# Patient Record
Sex: FEMALE | Race: WHITE | URBAN - METROPOLITAN AREA
[De-identification: names, ages, dates, MRNs, and addresses within clinical notes are randomized per-mention and may not be internally consistent; named-entity substitution may affect disease eponyms.]

---

## 2017-09-08 PROBLEM — M50.00 CERVICAL DISC DISEASE WITH MYELOPATHY: Status: ACTIVE | Noted: 2017-09-08

## 2018-02-07 PROCEDURE — 82746 ASSAY OF FOLIC ACID SERUM: CPT | Performed by: INTERNAL MEDICINE

## 2018-02-07 PROCEDURE — 82607 VITAMIN B-12: CPT | Performed by: INTERNAL MEDICINE

## 2019-03-13 PROBLEM — L81.4 LENTIGO: Status: ACTIVE | Noted: 2019-03-13

## 2019-03-28 PROBLEM — D23.9 TRICHOEPITHELIOMA: Status: ACTIVE | Noted: 2019-03-28

## 2020-08-24 NOTE — PROGRESS NOTES
HISTORY OF PRESENT ILLNESS  Patient presents with:  Weight Problem: referred by current patient      Daytonevelia Beltret is a 64year old female new to our office today for initiation of medical weight loss program.  Patient presents today with c/o excess negative  Diabetes: IFG  Thyroid disease: negative  Constipation: negative  Other pertinent hx: anemia- unknown type  Sleep Apnea hx: yes, no CPAP use  Cancer hx: negative  Family or personal history of Pancreatic issues / Medullary Thyroid Cancer: negativ HGB 11.9 (L) 07/14/2020    HCT 39.9 07/14/2020    MCV 77.8 (L) 07/14/2020    MCH 23.2 (L) 07/14/2020    MCHC 29.8 (L) 07/14/2020    RDW 16.5 (H) 07/14/2020     07/14/2020    MPV 9.0 (L) 12/27/2012     Lab Results   Component Value Date     mg total) by mouth every morning.  -     OP REFERRAL TO DIETITIAN [facility] (WLC USE ONLY)    IFG (impaired fasting glucose)  - reviewed labs in EMR  -     HEMOGLOBIN A1C; Future  -     OP REFERRAL TO DIETITIAN [facility]LC (WLC USE ONLY)    Vitamin D deficiency with meals and throughout the day, cut down on soda and/or juice if consumed. 2.  Eat breakfast daily and focus on having protein with each meal, examples include: greek yogurt, cottage cheese, hard boiled egg, whole grain toast with peanut butter.   3.  R

## 2020-08-25 ENCOUNTER — OFFICE VISIT (OUTPATIENT)
Dept: INTERNAL MEDICINE CLINIC | Facility: CLINIC | Age: 60
End: 2020-08-25
Payer: COMMERCIAL

## 2020-08-25 VITALS
DIASTOLIC BLOOD PRESSURE: 88 MMHG | BODY MASS INDEX: 42.63 KG/M2 | SYSTOLIC BLOOD PRESSURE: 130 MMHG | TEMPERATURE: 97 F | HEIGHT: 65.5 IN | WEIGHT: 259 LBS | RESPIRATION RATE: 14 BRPM | HEART RATE: 80 BPM

## 2020-08-25 DIAGNOSIS — Z51.81 ENCOUNTER FOR THERAPEUTIC DRUG MONITORING: Primary | ICD-10-CM

## 2020-08-25 DIAGNOSIS — E55.9 VITAMIN D DEFICIENCY: ICD-10-CM

## 2020-08-25 DIAGNOSIS — R73.01 IFG (IMPAIRED FASTING GLUCOSE): ICD-10-CM

## 2020-08-25 DIAGNOSIS — E66.01 MORBID OBESITY WITH BMI OF 40.0-44.9, ADULT (HCC): ICD-10-CM

## 2020-08-25 DIAGNOSIS — D64.9 ANEMIA, UNSPECIFIED TYPE: ICD-10-CM

## 2020-08-25 PROCEDURE — 3079F DIAST BP 80-89 MM HG: CPT | Performed by: NURSE PRACTITIONER

## 2020-08-25 PROCEDURE — 3008F BODY MASS INDEX DOCD: CPT | Performed by: NURSE PRACTITIONER

## 2020-08-25 PROCEDURE — 99204 OFFICE O/P NEW MOD 45 MIN: CPT | Performed by: NURSE PRACTITIONER

## 2020-08-25 PROCEDURE — 3075F SYST BP GE 130 - 139MM HG: CPT | Performed by: NURSE PRACTITIONER

## 2020-08-25 RX ORDER — VALACYCLOVIR HYDROCHLORIDE 1 G/1
TABLET, FILM COATED ORAL 2 TIMES DAILY
COMMUNITY
Start: 2020-03-18 | End: 2021-06-18

## 2020-08-25 RX ORDER — ERGOCALCIFEROL 1.25 MG/1
50000 CAPSULE ORAL WEEKLY
Qty: 12 CAPSULE | Refills: 1 | Status: SHIPPED | OUTPATIENT
Start: 2020-08-25 | End: 2021-03-26 | Stop reason: ALTCHOICE

## 2020-08-25 RX ORDER — PHENTERMINE HYDROCHLORIDE 15 MG/1
15 CAPSULE ORAL EVERY MORNING
Qty: 30 CAPSULE | Refills: 0 | Status: SHIPPED | OUTPATIENT
Start: 2020-08-25 | End: 2020-09-22 | Stop reason: DRUGHIGH

## 2020-08-25 NOTE — PATIENT INSTRUCTIONS
Welcome to the [facility]...your Lifestyle Renovation begins now! Thank you for placing your trust in our health care team, I look forward to working with you along this journey to better health!     Next steps:     1.  Sched minimum. Meditation daily can help manage and control stress. Chronic stress can make weight loss difficult.   Exercising is one way to help with stress, but meditation using the CALM Breonna or another comparable alternative can be done in your home or plac

## 2020-09-16 ENCOUNTER — OFFICE VISIT (OUTPATIENT)
Dept: INTERNAL MEDICINE CLINIC | Facility: CLINIC | Age: 60
End: 2020-09-16
Payer: COMMERCIAL

## 2020-09-16 DIAGNOSIS — E66.01 MORBID OBESITY WITH BMI OF 40.0-44.9, ADULT (HCC): ICD-10-CM

## 2020-09-16 DIAGNOSIS — R73.01 IFG (IMPAIRED FASTING GLUCOSE): ICD-10-CM

## 2020-09-16 DIAGNOSIS — E78.00 HYPERCHOLESTEROLEMIA: ICD-10-CM

## 2020-09-16 PROCEDURE — 97802 MEDICAL NUTRITION INDIV IN: CPT | Performed by: DIETITIAN, REGISTERED

## 2020-09-17 VITALS — WEIGHT: 254 LBS | BODY MASS INDEX: 41 KG/M2

## 2020-09-17 NOTE — PROGRESS NOTES
INITIAL OUTPATIENT NUTRITION CONSULTATION    Nutrition Assessment    Medical Diagnosis: obesity, hypercholesterolemia, IFT    Client Age and Gender: 64year old female    Marital Status and Occupation: , works PT from home.   Has adult children a 230 lbs winning work weight loss competition followed by regain after competition. Goal of weighing less than 200 lbs. Current Diet: Keto diet but not able to sustain low carb intake. High fat meats and dairy with limited vegetables. No fruit.   Most

## 2020-09-22 ENCOUNTER — OFFICE VISIT (OUTPATIENT)
Dept: INTERNAL MEDICINE CLINIC | Facility: CLINIC | Age: 60
End: 2020-09-22
Payer: COMMERCIAL

## 2020-09-22 VITALS
BODY MASS INDEX: 41.65 KG/M2 | SYSTOLIC BLOOD PRESSURE: 130 MMHG | DIASTOLIC BLOOD PRESSURE: 90 MMHG | HEART RATE: 80 BPM | TEMPERATURE: 97 F | RESPIRATION RATE: 14 BRPM | HEIGHT: 65.5 IN | WEIGHT: 253 LBS

## 2020-09-22 DIAGNOSIS — D50.9 IRON DEFICIENCY ANEMIA, UNSPECIFIED IRON DEFICIENCY ANEMIA TYPE: ICD-10-CM

## 2020-09-22 DIAGNOSIS — Z51.81 ENCOUNTER FOR THERAPEUTIC DRUG MONITORING: Primary | ICD-10-CM

## 2020-09-22 DIAGNOSIS — R73.03 PREDIABETES: ICD-10-CM

## 2020-09-22 DIAGNOSIS — E66.01 MORBID OBESITY WITH BMI OF 40.0-44.9, ADULT (HCC): ICD-10-CM

## 2020-09-22 LAB
FERRITIN, SERUM: 12 NG/ML (ref 15–150)
HEMOGLOBIN A1C: 6.1 (ref 4.8–5.6)
IRON SATURATION: 9 % (ref 15–55)
IRON: 35 UG/DL (ref 27–159)
TOTAL IRON BINDING CAPACITY: 401 UG/DL (ref 250–450)
UIBC: 366 UG/DL (ref 131–425)
VITAMIN B12: 553 PG/ML (ref 232–1245)

## 2020-09-22 PROCEDURE — 3008F BODY MASS INDEX DOCD: CPT | Performed by: NURSE PRACTITIONER

## 2020-09-22 PROCEDURE — 3080F DIAST BP >= 90 MM HG: CPT | Performed by: NURSE PRACTITIONER

## 2020-09-22 PROCEDURE — 3075F SYST BP GE 130 - 139MM HG: CPT | Performed by: NURSE PRACTITIONER

## 2020-09-22 PROCEDURE — 99214 OFFICE O/P EST MOD 30 MIN: CPT | Performed by: NURSE PRACTITIONER

## 2020-09-22 RX ORDER — PHENTERMINE HYDROCHLORIDE 37.5 MG/1
37.5 TABLET ORAL EVERY MORNING
Qty: 30 TABLET | Refills: 0 | Status: SHIPPED | OUTPATIENT
Start: 2020-09-22 | End: 2020-10-28

## 2020-09-22 RX ORDER — PHENTERMINE HYDROCHLORIDE 15 MG/1
15 CAPSULE ORAL EVERY MORNING
Qty: 30 CAPSULE | Refills: 0 | Status: CANCELLED | OUTPATIENT
Start: 2020-09-22

## 2020-09-22 NOTE — PATIENT INSTRUCTIONS
Continue making lifestyle changes that focus on good nutrition, regular exercise and stress management. Medication Plan: increase phentermine and add Metformin 1 tab daily with meal for 7 days, then increase to 1 tab twice a day as prescribed.     Next s · Being over age 39  · Have hypertension or elevated cholesterol   · Having had gestational diabetes  · Not being physically active  · Being [place] American, [place] American, , [place] Native, , or   Diagnosing prediab Symptoms of diabetes  Let your healthcare provider know if you have any of the following:  · Always feel very tired  · Feel very thirsty or hungry much of the time  · Have to urinate often  · Lose weight for no reason  · Feel numbness or tingling in your f Although daily physical activity, like walking, swimming and aerobics are essential to good heart health and weight management, combining muscle building weight training with cardiovascular exercise, gives you an unbeatable combination to lose fat and keep Non-heme iron is found in plant-based foods such as fruits, vegetables and nuts. Foods with non-heme iron are still an important part of a nutritious, well-balanced diet, but the iron contained in these foods won't be absorbed as completely.  You absorb bet

## 2020-09-22 NOTE — PROGRESS NOTES
Margorie Essex is a 64year old female presents today for 1 month follow-up on medical weight loss program for the treatment of overweight, obesity, or morbid obesity with associated prediabetes, Vitamin D deficiency, WANG.     S:  Current weight Wt Mellissa Deleon /90   Pulse 80   Temp 97.3 °F (36.3 °C)   Resp 14   Ht 65.5\"   Wt 253 lb (114.8 kg)   BMI 41.46 kg/m²   GENERAL: well developed, well nourished, in no apparent distress, morbidly obese  EYES: conjunctiva pink, sclera non icteric, PERRLA  LUNGS: CTA Medication Plan: increase phentermine and add Metformin 1 tab daily with meal for 7 days, then increase to 1 tab twice a day as prescribed.     Next steps to work on before next office visit include: Great work with incorporating more veggies and reducing h · Not being physically active  · Being [place] American, [place] American, , [place] Native, , or [place] Islander  Diagnosing prediabetes  Prediabetes may have no symptoms or you may have some of the symptoms of diabetes.  The diagnosis · Always feel very tired  · Feel very thirsty or hungry much of the time  · Have to urinate often  · Lose weight for no reason  · Feel numbness or tingling in your fingers or toes  · Have cuts or bruises that don’t seem to heal  · Have blurry vision   Date Of course it takes a few weeks before you see any measurable changes. But you’ll start to build muscle, lose fat and burn more calories from the moment you begin weight training. Building muscle helps you:  1. Burn more calories.  Unlike fat, muscle beefs u When you eat heme iron with foods higher in non-heme iron, the iron will be more completely absorbed by your body.  Foods high in vitamin C – like tomatoes, citrus fruits and red, yellow and orange peppers – can also help with the absorption of non-heme iro

## 2020-10-12 ENCOUNTER — PATIENT MESSAGE (OUTPATIENT)
Dept: INTERNAL MEDICINE CLINIC | Facility: CLINIC | Age: 60
End: 2020-10-12

## 2020-10-13 NOTE — TELEPHONE ENCOUNTER
To my knowledge it was only the extended release lots that were recalled, no IR. Can we call her pharmacy to see what the issue is if they are not dispensing the medication? I have not received any other calls from patients taking the IR version.  It may on

## 2020-10-13 NOTE — TELEPHONE ENCOUNTER
From: Anthony Petit  To: DELIA Oswald  Sent: 10/12/2020 5:12 PM CDT  Subject: Prescription Question    I was put on Metformin last visit-I am seeing a ton of recalls should I be taking this?  Its produced by Bruce Walker

## 2020-10-14 NOTE — TELEPHONE ENCOUNTER
Pharmacy is closed until 9 am.  I called the pharmacy back and verified that none of the IR metformin is recalled and Not ALL Metformin ER is recalled. Patient is safe to continue IR.

## 2020-10-20 ENCOUNTER — TELEPHONE (OUTPATIENT)
Dept: INTERNAL MEDICINE CLINIC | Facility: CLINIC | Age: 60
End: 2020-10-20

## 2020-10-28 ENCOUNTER — PATIENT MESSAGE (OUTPATIENT)
Dept: INTERNAL MEDICINE CLINIC | Facility: CLINIC | Age: 60
End: 2020-10-28

## 2020-10-28 ENCOUNTER — OFFICE VISIT (OUTPATIENT)
Dept: INTERNAL MEDICINE CLINIC | Facility: CLINIC | Age: 60
End: 2020-10-28
Payer: COMMERCIAL

## 2020-10-28 VITALS
SYSTOLIC BLOOD PRESSURE: 120 MMHG | BODY MASS INDEX: 40.82 KG/M2 | HEIGHT: 65.5 IN | HEART RATE: 78 BPM | WEIGHT: 248 LBS | DIASTOLIC BLOOD PRESSURE: 68 MMHG | RESPIRATION RATE: 14 BRPM

## 2020-10-28 DIAGNOSIS — R73.03 PREDIABETES: ICD-10-CM

## 2020-10-28 DIAGNOSIS — E78.00 HYPERCHOLESTEROLEMIA: ICD-10-CM

## 2020-10-28 DIAGNOSIS — Z51.81 ENCOUNTER FOR THERAPEUTIC DRUG MONITORING: Primary | ICD-10-CM

## 2020-10-28 DIAGNOSIS — E66.01 MORBID OBESITY WITH BMI OF 40.0-44.9, ADULT (HCC): ICD-10-CM

## 2020-10-28 PROCEDURE — 3074F SYST BP LT 130 MM HG: CPT | Performed by: NURSE PRACTITIONER

## 2020-10-28 PROCEDURE — 3078F DIAST BP <80 MM HG: CPT | Performed by: NURSE PRACTITIONER

## 2020-10-28 PROCEDURE — 99213 OFFICE O/P EST LOW 20 MIN: CPT | Performed by: NURSE PRACTITIONER

## 2020-10-28 PROCEDURE — 3008F BODY MASS INDEX DOCD: CPT | Performed by: NURSE PRACTITIONER

## 2020-10-28 RX ORDER — PHENTERMINE HYDROCHLORIDE 37.5 MG/1
37.5 TABLET ORAL EVERY MORNING
Qty: 30 TABLET | Refills: 0 | Status: SHIPPED | OUTPATIENT
Start: 2020-10-28 | End: 2020-11-24

## 2020-10-28 NOTE — PATIENT INSTRUCTIONS
Continue making lifestyle changes that focus on good nutrition, regular exercise and stress management. Medication Plan: Continue current medication regimen aside from stop Metformin due to side effects.     Next steps to work on before next office visit cells depend on the nutrients you get from food. That is why healthy food choices are so important. Once you can start to see food as being fuel for your body, you will get better at making the healthy choice the easy choice.  Food will be less about rewa with Tracie Frias [phone]  · 360FIT Kassandra @ [email or web address] Group Fitness [phone] and/or email Wicho riddle at [email or web address]. [employer]  · Mateo Goff in [place] - group fitness and personal training for women    Online F

## 2020-10-28 NOTE — PROGRESS NOTES
Danii Villagomez is a 64year old female presents today for 2 month follow-up on medical weight loss program for the treatment of overweight, obesity, or morbid obesity with associated prediabetes, Vitamin D deficiency, WANG.     S:  Current weight Wt Mumtaz Henley developed, well nourished, in no apparent distress, morbidly obese  EYES: conjunctiva pink, sclera non icteric, PERRLA  LUNGS: CTA in all fields, breathing non labored  CARDIO: RRR without murmur, normal S1 and S2 without clicks or gallops, no pedal edema. Nutrition • By Your Weight Matters Campaign    “Dieting” can start to feel challenging when we begin to associate healthy behaviors with “punishment.” Too often, we overlook the real reason we eat food.  It's not only meant to be enjoyed, but also to provid other difficult health conditions start to arise.   Foods that Support Healthy Cells:  • Unsaturated Fats – Fish, nuts avocados, olive oil, flax seed, etc.  • Protein – Poultry, lean beef, yogurt, eggs, seeds, beans, etc.  • Antioxidants – Fruits, dark gree https://GoSporty/5min-kitchen-workout/    Nutrition Trackers and Tools  · MyFitness Pal  · LoseIT! · FitFoundation (healthy meals on the go) in [facility] @ www. myhaifbawpcat6d.PlaceSpeak  · Arnie HARRELL- on line prepared meals to go  · Metabolic Meals- on

## 2020-10-29 NOTE — TELEPHONE ENCOUNTER
From: Nelda Livingston  To: DELIA Lucia  Sent: 10/28/2020 7:32 PM CDT  Subject: Visit Follow-up Question    Can you tell me the name of the documentary you told me to watch about food today

## 2020-11-19 ENCOUNTER — TELEPHONE (OUTPATIENT)
Dept: INTERNAL MEDICINE CLINIC | Facility: CLINIC | Age: 60
End: 2020-11-19

## 2020-11-19 DIAGNOSIS — R73.03 PREDIABETES: ICD-10-CM

## 2020-11-19 DIAGNOSIS — Z51.81 ENCOUNTER FOR THERAPEUTIC DRUG MONITORING: ICD-10-CM

## 2020-11-19 DIAGNOSIS — E66.01 MORBID OBESITY WITH BMI OF 40.0-44.9, ADULT (HCC): ICD-10-CM

## 2020-11-19 NOTE — TELEPHONE ENCOUNTER
Rx denied. Per [facility] office note dated 10/28/20 pt is to discontinue medication. Medication Plan: Continue current medication regimen aside from stop Metformin due to side effects.

## 2020-11-23 NOTE — PROGRESS NOTES
Ilia Spring is a 64year old female presents today for 3 month follow-up on medical weight loss program for the treatment of overweight, obesity, or morbid obesity with associated prediabetes, Vitamin D deficiency, WANG.     S:  Current weight Wt Ebbie Good apparent distress, obese  EYES: conjunctiva pink, sclera non icteric, PERRLA  LUNGS: CTA in all fields, breathing non labored  CARDIO: RRR without murmur, normal S1 and S2 without clicks or gallops, no pedal edema.   GI: +BS  NEURO/MS: motor and sensory jimmie work on before next office visit include: Great job being mindful about your hunger, cravings and food choices! Continue this practice into the holidays. Remember moderation is key. Top Tips for Weight Loss    1.  Practice Mindful Eating and listen to yo

## 2020-11-24 ENCOUNTER — OFFICE VISIT (OUTPATIENT)
Dept: INTERNAL MEDICINE CLINIC | Facility: CLINIC | Age: 60
End: 2020-11-24
Payer: COMMERCIAL

## 2020-11-24 ENCOUNTER — PATIENT MESSAGE (OUTPATIENT)
Dept: INTERNAL MEDICINE CLINIC | Facility: CLINIC | Age: 60
End: 2020-11-24

## 2020-11-24 VITALS
HEART RATE: 80 BPM | SYSTOLIC BLOOD PRESSURE: 122 MMHG | RESPIRATION RATE: 16 BRPM | BODY MASS INDEX: 39.84 KG/M2 | DIASTOLIC BLOOD PRESSURE: 84 MMHG | WEIGHT: 242 LBS | HEIGHT: 65.5 IN

## 2020-11-24 DIAGNOSIS — R63.8 CRAVING FOR PARTICULAR FOOD: ICD-10-CM

## 2020-11-24 DIAGNOSIS — Z51.81 ENCOUNTER FOR THERAPEUTIC DRUG MONITORING: Primary | ICD-10-CM

## 2020-11-24 DIAGNOSIS — E78.00 HYPERCHOLESTEROLEMIA: ICD-10-CM

## 2020-11-24 DIAGNOSIS — E66.01 MORBID OBESITY WITH BMI OF 40.0-44.9, ADULT (HCC): ICD-10-CM

## 2020-11-24 DIAGNOSIS — R73.03 PREDIABETES: ICD-10-CM

## 2020-11-24 DIAGNOSIS — Z51.81 ENCOUNTER FOR THERAPEUTIC DRUG MONITORING: ICD-10-CM

## 2020-11-24 PROCEDURE — 99214 OFFICE O/P EST MOD 30 MIN: CPT | Performed by: NURSE PRACTITIONER

## 2020-11-24 PROCEDURE — 3079F DIAST BP 80-89 MM HG: CPT | Performed by: NURSE PRACTITIONER

## 2020-11-24 PROCEDURE — 3008F BODY MASS INDEX DOCD: CPT | Performed by: NURSE PRACTITIONER

## 2020-11-24 PROCEDURE — 3074F SYST BP LT 130 MM HG: CPT | Performed by: NURSE PRACTITIONER

## 2020-11-24 PROCEDURE — 99072 ADDL SUPL MATRL&STAF TM PHE: CPT | Performed by: NURSE PRACTITIONER

## 2020-11-24 RX ORDER — TOPIRAMATE 25 MG/1
25 TABLET ORAL 2 TIMES DAILY
Qty: 60 TABLET | Refills: 1 | Status: SHIPPED | OUTPATIENT
Start: 2020-11-24 | End: 2021-01-13

## 2020-11-24 RX ORDER — PHENTERMINE HYDROCHLORIDE 37.5 MG/1
37.5 TABLET ORAL EVERY MORNING
Qty: 30 TABLET | Refills: 1 | Status: SHIPPED | OUTPATIENT
Start: 2020-11-24 | End: 2021-01-13

## 2020-11-24 NOTE — PATIENT INSTRUCTIONS
Continue making lifestyle changes that focus on good nutrition, regular exercise and stress management. Medication Plan: Continue current medication regimen, can continue to Metformin at dinner 1-2 tabs.  Additionally start Topamax at 1 tab daily for 7 d

## 2020-11-24 NOTE — TELEPHONE ENCOUNTER
Requesting Metformin 500   LOV: 11/23  RTC: 2 mtht  Last Relevant Labs:   Filled: 9/22 #60 with 1 refills    No future appointments.

## 2020-11-24 NOTE — TELEPHONE ENCOUNTER
From: Nicki Lloyd  To: DELIA Dubois  Sent: 11/24/2020 2:05 PM CST  Subject: Prescription Question    Can you refill the Metformin 500 Thanks

## 2020-12-01 NOTE — TELEPHONE ENCOUNTER
Prescription was printed not sent.  [facility] [place] [facility]

## 2021-01-13 ENCOUNTER — OFFICE VISIT (OUTPATIENT)
Dept: INTERNAL MEDICINE CLINIC | Facility: CLINIC | Age: 61
End: 2021-01-13
Payer: COMMERCIAL

## 2021-01-13 VITALS
SYSTOLIC BLOOD PRESSURE: 120 MMHG | HEART RATE: 80 BPM | HEIGHT: 65.5 IN | DIASTOLIC BLOOD PRESSURE: 80 MMHG | WEIGHT: 231 LBS | BODY MASS INDEX: 38.02 KG/M2 | RESPIRATION RATE: 14 BRPM

## 2021-01-13 DIAGNOSIS — Z51.81 ENCOUNTER FOR THERAPEUTIC DRUG MONITORING: Primary | ICD-10-CM

## 2021-01-13 DIAGNOSIS — R73.03 PREDIABETES: ICD-10-CM

## 2021-01-13 DIAGNOSIS — R63.8 CRAVING FOR PARTICULAR FOOD: ICD-10-CM

## 2021-01-13 DIAGNOSIS — E55.9 VITAMIN D DEFICIENCY: ICD-10-CM

## 2021-01-13 DIAGNOSIS — E66.01 MORBID OBESITY WITH BMI OF 40.0-44.9, ADULT (HCC): ICD-10-CM

## 2021-01-13 PROCEDURE — 3008F BODY MASS INDEX DOCD: CPT | Performed by: NURSE PRACTITIONER

## 2021-01-13 PROCEDURE — 99214 OFFICE O/P EST MOD 30 MIN: CPT | Performed by: NURSE PRACTITIONER

## 2021-01-13 PROCEDURE — 3079F DIAST BP 80-89 MM HG: CPT | Performed by: NURSE PRACTITIONER

## 2021-01-13 PROCEDURE — 3074F SYST BP LT 130 MM HG: CPT | Performed by: NURSE PRACTITIONER

## 2021-01-13 RX ORDER — PHENTERMINE HYDROCHLORIDE 37.5 MG/1
37.5 TABLET ORAL EVERY MORNING
Qty: 30 TABLET | Refills: 2 | Status: SHIPPED | OUTPATIENT
Start: 2021-01-13 | End: 2021-04-15

## 2021-01-13 RX ORDER — TOPIRAMATE 50 MG/1
50 TABLET, FILM COATED ORAL 2 TIMES DAILY
Qty: 60 TABLET | Refills: 2 | Status: SHIPPED | OUTPATIENT
Start: 2021-01-13 | End: 2021-04-15

## 2021-01-13 NOTE — PROGRESS NOTES
Danii Villagomez is a 64year old female presents today for 5 month follow-up on medical weight loss program for the treatment of overweight, obesity, or morbid obesity with associated prediabetes, Vitamin D deficiency, WANG.     S:  Current weight Wt Mumtaz Henley without murmur, normal S1 and S2 without clicks or gallops, no pedal edema.   GI: +BS  NEURO/MS: motor and sensory grossly intact  PSYCH: pleasant, cooperative, normal mood and affect    ASSESSMENT AND PLAN:  Encounter for therapeutic drug monitoring  (prim D level and HgbA1c. Build Muscle & Lose Fat  The great fat vs muscle diagram below paints a clear picture of why it’s so important for you to build muscle in order to lose fat.   Maybe Kris Rivera wondered about muscle vs fat and why you need to build muscle level of confidence in your abilities to perform many lifestyle activities. 4. Prevent injury. Strength training can build stronger muscles and more limber, flexible joints, which play a crucial role in preventing injury. 5. Increase bone density.  Weight and thirst. As a result, you get hungry and want to eat. When we gain weight, our brains become less sensitive to signals that control how responsive our brain is to nutrients.  For example, someone at a higher weight may not have as much leptin (the ful article (CityPerson.tn. org/community/hunger-why-do-we-have-cravings-and-what-can-gg-aw-xjsib-them/) from the [address] of the [facility].      Understanding the Link Between Biology, Weight and Health  S Our environment can make weight-loss difficult by providing readily available, energy dense and high palatable foods while promoting sedentary levels and low activity  Our weight affects our health, not just our appearance – Although weight is a concern fo exercise routine, or eating at home more with your family. These are just some of your many options! Want more information about the link between your biology, weight and health?  You can watch the full video online as Dr. Dot Galvan speaks to attende

## 2021-01-13 NOTE — PATIENT INSTRUCTIONS
Continue making lifestyle changes that focus on good nutrition, regular exercise and stress management. Medication Plan: Continue current medication regimen, aside from increase Topamax to 50 mg twice a day for cravings.     Next steps to work on before measurable changes. But you’ll start to build muscle, lose fat and burn more calories from the moment you begin weight training. Building muscle helps you:  1. Burn more calories.  Unlike fat, muscle beefs up your metabolism to help you burn about 70% more same way. It is important that the role of biology in hunger is more widely recognized to help others understand how complicated weight issues can be. Gut-Brain Hunger Signals  Why do we get hungry?  Your digestive tract has receptors that can detect whe hunger signals. However, it can be tough to manage when you are really hungry, stressed or upset in any way. When we are in a better mood and not starving, it’s easier to ignore food cues and cravings.  When we are stressed or depressed, we are more like that we inherit through birth and are passed down through our families   • Stress/mood – Our mental health affects our hunger, sleep cycle and other important brain functions   • Medications – Many medications are associated with weight gain such as insuli this, but try doing what you can! Strive for at least seven to eight hours of sleep each night by going to bed earlier, relaxing before bedtime or drinking calming tea in the evenings.    • Monitor food intake – Be aware of what’s going into your body and h

## 2021-02-04 DIAGNOSIS — R73.03 PREDIABETES: ICD-10-CM

## 2021-02-04 DIAGNOSIS — R63.8 CRAVING FOR PARTICULAR FOOD: ICD-10-CM

## 2021-02-04 DIAGNOSIS — E55.9 VITAMIN D DEFICIENCY: ICD-10-CM

## 2021-02-04 DIAGNOSIS — E66.01 MORBID OBESITY WITH BMI OF 40.0-44.9, ADULT (HCC): ICD-10-CM

## 2021-02-04 DIAGNOSIS — Z51.81 ENCOUNTER FOR THERAPEUTIC DRUG MONITORING: ICD-10-CM

## 2021-02-04 RX ORDER — TOPIRAMATE 50 MG/1
50 TABLET, FILM COATED ORAL 2 TIMES DAILY
Qty: 60 TABLET | Refills: 2 | Status: CANCELLED | OUTPATIENT
Start: 2021-02-04

## 2021-02-04 RX ORDER — ERGOCALCIFEROL 1.25 MG/1
50000 CAPSULE ORAL WEEKLY
Qty: 12 CAPSULE | Refills: 1 | Status: CANCELLED | OUTPATIENT
Start: 2021-02-04

## 2021-02-04 RX ORDER — PHENTERMINE HYDROCHLORIDE 37.5 MG/1
37.5 TABLET ORAL EVERY MORNING
Qty: 30 TABLET | Refills: 2 | Status: CANCELLED | OUTPATIENT
Start: 2021-02-04

## 2021-02-05 NOTE — TELEPHONE ENCOUNTER
Requesting Vitamin D, Phentermine, Metformin, Topamax  LOV: 1/13/21  RTC: 3 months  Last Relevant Labs: 7/14/20 and 8/27/20  Filled: 8/25/20 #12 with 1 refills Vitamin D  Filled: 1/13/21 #30 with 2 refills  Phentemrine  Filled: 1/13/21 #60 with 1 refills  Metformin  Filled: 1/13/21 #60 with 2 refills  Topamax    No future appointments.

## 2021-04-14 DIAGNOSIS — E66.01 MORBID OBESITY WITH BMI OF 40.0-44.9, ADULT (HCC): ICD-10-CM

## 2021-04-14 DIAGNOSIS — R63.8 CRAVING FOR PARTICULAR FOOD: ICD-10-CM

## 2021-04-14 DIAGNOSIS — R73.03 PREDIABETES: ICD-10-CM

## 2021-04-14 DIAGNOSIS — Z51.81 ENCOUNTER FOR THERAPEUTIC DRUG MONITORING: ICD-10-CM

## 2021-04-19 RX ORDER — TOPIRAMATE 50 MG/1
TABLET, FILM COATED ORAL
Qty: 60 TABLET | Refills: 2 | OUTPATIENT
Start: 2021-04-19

## 2021-04-19 RX ORDER — PHENTERMINE HYDROCHLORIDE 37.5 MG/1
TABLET ORAL
Qty: 30 TABLET | Refills: 0 | OUTPATIENT
Start: 2021-04-19

## 2021-04-19 RX ORDER — PHENTERMINE HYDROCHLORIDE 37.5 MG/1
37.5 TABLET ORAL EVERY MORNING
Qty: 30 TABLET | Refills: 2 | OUTPATIENT
Start: 2021-04-19

## 2021-04-19 RX ORDER — TOPIRAMATE 50 MG/1
50 TABLET, FILM COATED ORAL 2 TIMES DAILY
Qty: 60 TABLET | Refills: 2 | OUTPATIENT
Start: 2021-04-19

## 2021-04-19 NOTE — TELEPHONE ENCOUNTER
Requesting   Requested Prescriptions     Pending Prescriptions Disp Refills   • Phentermine HCl 37.5 MG Oral Tab 30 tablet 2     Sig: Take 1 tablet (37.5 mg total) by mouth every morning.    • metFORMIN HCl 500 MG Oral Tab 60 tablet 1     Sig: Take 1 tablet

## 2021-04-21 ENCOUNTER — LAB ENCOUNTER (OUTPATIENT)
Dept: LAB | Age: 61
End: 2021-04-21
Attending: NURSE PRACTITIONER
Payer: COMMERCIAL

## 2021-04-21 ENCOUNTER — OFFICE VISIT (OUTPATIENT)
Dept: INTERNAL MEDICINE CLINIC | Facility: CLINIC | Age: 61
End: 2021-04-21
Payer: COMMERCIAL

## 2021-04-21 VITALS
RESPIRATION RATE: 14 BRPM | HEIGHT: 65.5 IN | HEART RATE: 78 BPM | BODY MASS INDEX: 35.72 KG/M2 | WEIGHT: 217 LBS | DIASTOLIC BLOOD PRESSURE: 80 MMHG | SYSTOLIC BLOOD PRESSURE: 120 MMHG

## 2021-04-21 DIAGNOSIS — R63.8 CRAVING FOR PARTICULAR FOOD: ICD-10-CM

## 2021-04-21 DIAGNOSIS — E66.01 MORBID OBESITY WITH BMI OF 40.0-44.9, ADULT (HCC): ICD-10-CM

## 2021-04-21 DIAGNOSIS — R73.03 PREDIABETES: ICD-10-CM

## 2021-04-21 DIAGNOSIS — Z51.81 ENCOUNTER FOR THERAPEUTIC DRUG MONITORING: Primary | ICD-10-CM

## 2021-04-21 PROCEDURE — 3008F BODY MASS INDEX DOCD: CPT | Performed by: NURSE PRACTITIONER

## 2021-04-21 PROCEDURE — 82306 VITAMIN D 25 HYDROXY: CPT | Performed by: NURSE PRACTITIONER

## 2021-04-21 PROCEDURE — 3074F SYST BP LT 130 MM HG: CPT | Performed by: NURSE PRACTITIONER

## 2021-04-21 PROCEDURE — 3079F DIAST BP 80-89 MM HG: CPT | Performed by: NURSE PRACTITIONER

## 2021-04-21 PROCEDURE — 99213 OFFICE O/P EST LOW 20 MIN: CPT | Performed by: NURSE PRACTITIONER

## 2021-04-21 RX ORDER — PHENTERMINE HYDROCHLORIDE 37.5 MG/1
37.5 TABLET ORAL EVERY MORNING
Qty: 30 TABLET | Refills: 2 | Status: SHIPPED | OUTPATIENT
Start: 2021-04-21 | End: 2021-07-19

## 2021-04-21 NOTE — PATIENT INSTRUCTIONS
Continue making lifestyle changes that focus on good nutrition, regular exercise and stress management. Medication Plan: Continue current medication regimen.  Can check to see if insurance covers branded Qsymia (combo of phentermine and topamax) to make

## 2021-04-21 NOTE — PROGRESS NOTES
Danii Villagomez is a 64year old female presents today for 8 month follow-up on medical weight loss program for the treatment of overweight, obesity, or morbid obesity with associated prediabetes, Vitamin D deficiency, WANG.     S:  Current weight Wt Mumtaz Henley developed, well nourished, in no apparent distress, obese  EYES: conjunctiva pink, sclera non icteric, PERRLA  LUNGS: CTA in all fields, breathing non labored  CARDIO: RRR without murmur, normal S1 and S2 without clicks or gallops, no pedal edema.   GI: +BS weight loss! Continue to be mindful of nutrition choices, portions and timing. Adding varied exercise as planned. Consider tracking nutrition to remain accountable for both quality and quantity of food.  Additionally if you would like to follow up with our

## 2021-07-19 ENCOUNTER — OFFICE VISIT (OUTPATIENT)
Dept: INTERNAL MEDICINE CLINIC | Facility: CLINIC | Age: 61
End: 2021-07-19
Payer: COMMERCIAL

## 2021-07-19 VITALS
WEIGHT: 210 LBS | HEIGHT: 65.5 IN | BODY MASS INDEX: 34.57 KG/M2 | SYSTOLIC BLOOD PRESSURE: 110 MMHG | HEART RATE: 70 BPM | RESPIRATION RATE: 14 BRPM | DIASTOLIC BLOOD PRESSURE: 70 MMHG

## 2021-07-19 DIAGNOSIS — R73.03 PREDIABETES: ICD-10-CM

## 2021-07-19 DIAGNOSIS — L65.9 HAIR THINNING: ICD-10-CM

## 2021-07-19 DIAGNOSIS — D50.9 IRON DEFICIENCY ANEMIA, UNSPECIFIED IRON DEFICIENCY ANEMIA TYPE: ICD-10-CM

## 2021-07-19 DIAGNOSIS — R63.8 CRAVING FOR PARTICULAR FOOD: ICD-10-CM

## 2021-07-19 DIAGNOSIS — E55.9 VITAMIN D DEFICIENCY: ICD-10-CM

## 2021-07-19 DIAGNOSIS — E66.01 MORBID OBESITY WITH BMI OF 40.0-44.9, ADULT (HCC): ICD-10-CM

## 2021-07-19 DIAGNOSIS — Z51.81 ENCOUNTER FOR THERAPEUTIC DRUG MONITORING: Primary | ICD-10-CM

## 2021-07-19 PROCEDURE — 3078F DIAST BP <80 MM HG: CPT | Performed by: NURSE PRACTITIONER

## 2021-07-19 PROCEDURE — 99214 OFFICE O/P EST MOD 30 MIN: CPT | Performed by: NURSE PRACTITIONER

## 2021-07-19 PROCEDURE — 3008F BODY MASS INDEX DOCD: CPT | Performed by: NURSE PRACTITIONER

## 2021-07-19 PROCEDURE — 3074F SYST BP LT 130 MM HG: CPT | Performed by: NURSE PRACTITIONER

## 2021-07-19 RX ORDER — METFORMIN HYDROCHLORIDE 750 MG/1
1500 TABLET, EXTENDED RELEASE ORAL
Qty: 180 TABLET | Refills: 1 | Status: SHIPPED | OUTPATIENT
Start: 2021-07-19 | End: 2021-11-10

## 2021-07-19 RX ORDER — TOPIRAMATE 50 MG/1
50 TABLET, FILM COATED ORAL 2 TIMES DAILY
Qty: 60 TABLET | Refills: 2 | Status: SHIPPED | OUTPATIENT
Start: 2021-07-19 | End: 2021-11-10

## 2021-07-19 RX ORDER — PHENTERMINE HYDROCHLORIDE 37.5 MG/1
37.5 TABLET ORAL EVERY MORNING
Qty: 30 TABLET | Refills: 2 | Status: SHIPPED | OUTPATIENT
Start: 2021-07-19 | End: 2021-11-10

## 2021-07-19 NOTE — PROGRESS NOTES
Anmol Mcmillan is a 64year old female presents today for 11 month follow-up on medical weight loss program for the treatment of overweight, obesity, or morbid obesity with associated prediabetes, Vitamin D deficiency, WANG.     S:  Current weight Wt Re conjunctiva pink, sclera non icteric, PERRLA  LUNGS: CTA in all fields, breathing non labored  CARDIO: RRR without murmur, normal S1 and S2 without clicks or gallops, no pedal edema.   GI: +BS  NEURO/MS: motor and sensory grossly intact  PSYCH: pleasant, co aside from stop Metformin and switch to Metformin ER at 1500 mg daily. Next steps to work on before next office visit include: Great work with continued weight loss, now at 20% total body weight loss.  At this time the body can experience a process call are present. When you don’t have enough nutrients, these receptors tell your stomach to produce hunger hormones that send a signal to your hypothalamus – the part of your brain that controls hunger and thirst. As a result, you get hungry and want to eat. based on our feelings. This is called emotional eating or stress eating. Managing Hunger and Cravings  So, what does all this mean and how can you manage hunger and cravings? Check out the full article (CityPerson.tn. org/community/hunger-why medications for diabetes, antidepressants and more   • Metabolic adaptation – Changes in our metabolism and physiology can affect our energy needs that influence weight-loss   • Environmental drivers – Our environment can make weight-loss difficult by prov Are you controlling your portions? Eating foods that are less-processed and rich with nutrients? • Establish healthy habits – Simple lifestyle changes can do a lot for your body.  Try developing an exercise routine, or eating at home more with your family transmit messages about your appetite to your brain. Talk or eat: When you are talking, stop eating. When you are eating, stop talking. Stay connected: Pay attention to your body's appetite signals while you are eating.     Pause while you are eating:

## 2021-07-19 NOTE — PATIENT INSTRUCTIONS
Continue making lifestyle changes that focus on good nutrition, regular exercise and stress management. Medication Plan: Continue current medication regimen aside from stop Metformin and switch to Metformin ER at 1500 mg daily.      Next steps to work on understand how complicated weight issues can be. Gut-Brain Hunger Signals  Why do we get hungry? Your digestive tract has receptors that can detect when nutrients are present.  When you don’t have enough nutrients, these receptors tell your stomach to pr any way. When we are in a better mood and not starving, it’s easier to ignore food cues and cravings. When we are stressed or depressed, we are more likely to eat based on our feelings. This is called emotional eating or stress eating.     Managing Hunge health affects our hunger, sleep cycle and other important brain functions   • Medications – Many medications are associated with weight gain such as insulin, oral medications for diabetes, antidepressants and more   • Metabolic adaptation – Changes in our going to bed earlier, relaxing before bedtime or drinking calming tea in the evenings. • Monitor food intake – Be aware of what’s going into your body and how much. Are you controlling your portions?  Eating foods that are less-processed and rich with nut mouthful. Chew every mouthful: Chewing a lot helps to thoroughly release the flavor of foods. Let food sit on your tongue. This allows your taste buds to absorb the flavor and transmit messages about your appetite to your brain. Talk or eat:  When you

## 2021-11-10 ENCOUNTER — LAB ENCOUNTER (OUTPATIENT)
Dept: LAB | Age: 61
End: 2021-11-10
Attending: NURSE PRACTITIONER
Payer: COMMERCIAL

## 2021-11-10 ENCOUNTER — OFFICE VISIT (OUTPATIENT)
Dept: INTERNAL MEDICINE CLINIC | Facility: CLINIC | Age: 61
End: 2021-11-10
Payer: COMMERCIAL

## 2021-11-10 VITALS
HEIGHT: 65.5 IN | BODY MASS INDEX: 33.91 KG/M2 | DIASTOLIC BLOOD PRESSURE: 86 MMHG | HEART RATE: 92 BPM | RESPIRATION RATE: 18 BRPM | WEIGHT: 206 LBS | SYSTOLIC BLOOD PRESSURE: 118 MMHG

## 2021-11-10 DIAGNOSIS — E66.01 MORBID OBESITY WITH BMI OF 40.0-44.9, ADULT (HCC): ICD-10-CM

## 2021-11-10 DIAGNOSIS — E55.9 VITAMIN D DEFICIENCY: ICD-10-CM

## 2021-11-10 DIAGNOSIS — R73.03 PREDIABETES: ICD-10-CM

## 2021-11-10 DIAGNOSIS — R63.8 CRAVING FOR PARTICULAR FOOD: ICD-10-CM

## 2021-11-10 DIAGNOSIS — Z51.81 ENCOUNTER FOR THERAPEUTIC DRUG MONITORING: Primary | ICD-10-CM

## 2021-11-10 DIAGNOSIS — L65.9 HAIR THINNING: ICD-10-CM

## 2021-11-10 DIAGNOSIS — Z51.81 ENCOUNTER FOR THERAPEUTIC DRUG MONITORING: ICD-10-CM

## 2021-11-10 PROCEDURE — 3079F DIAST BP 80-89 MM HG: CPT | Performed by: NURSE PRACTITIONER

## 2021-11-10 PROCEDURE — 83036 HEMOGLOBIN GLYCOSYLATED A1C: CPT | Performed by: NURSE PRACTITIONER

## 2021-11-10 PROCEDURE — 84443 ASSAY THYROID STIM HORMONE: CPT | Performed by: NURSE PRACTITIONER

## 2021-11-10 PROCEDURE — 3074F SYST BP LT 130 MM HG: CPT | Performed by: NURSE PRACTITIONER

## 2021-11-10 PROCEDURE — 84439 ASSAY OF FREE THYROXINE: CPT | Performed by: NURSE PRACTITIONER

## 2021-11-10 PROCEDURE — 99214 OFFICE O/P EST MOD 30 MIN: CPT | Performed by: NURSE PRACTITIONER

## 2021-11-10 PROCEDURE — 85025 COMPLETE CBC W/AUTO DIFF WBC: CPT | Performed by: NURSE PRACTITIONER

## 2021-11-10 PROCEDURE — 82306 VITAMIN D 25 HYDROXY: CPT | Performed by: NURSE PRACTITIONER

## 2021-11-10 PROCEDURE — 3008F BODY MASS INDEX DOCD: CPT | Performed by: NURSE PRACTITIONER

## 2021-11-10 PROCEDURE — 82728 ASSAY OF FERRITIN: CPT | Performed by: NURSE PRACTITIONER

## 2021-11-10 RX ORDER — ERGOCALCIFEROL 1.25 MG/1
50000 CAPSULE ORAL WEEKLY
Qty: 24 CAPSULE | Refills: 0 | Status: CANCELLED | OUTPATIENT
Start: 2021-11-10

## 2021-11-10 RX ORDER — TOPIRAMATE 100 MG/1
100 TABLET, FILM COATED ORAL 2 TIMES DAILY
Qty: 180 TABLET | Refills: 1 | Status: SHIPPED | OUTPATIENT
Start: 2021-11-10

## 2021-11-10 RX ORDER — PHENTERMINE HYDROCHLORIDE 37.5 MG/1
37.5 TABLET ORAL EVERY MORNING
Qty: 30 TABLET | Refills: 2 | Status: SHIPPED | OUTPATIENT
Start: 2021-11-10 | End: 2022-02-03

## 2021-11-10 RX ORDER — SEMAGLUTIDE 0.25 MG/.5ML
0.25 INJECTION, SOLUTION SUBCUTANEOUS WEEKLY
Qty: 4 EACH | Refills: 0 | Status: SHIPPED | OUTPATIENT
Start: 2021-11-10 | End: 2021-12-08 | Stop reason: DRUGHIGH

## 2021-11-10 RX ORDER — METFORMIN HYDROCHLORIDE 750 MG/1
1500 TABLET, EXTENDED RELEASE ORAL
Qty: 180 TABLET | Refills: 1 | Status: SHIPPED | OUTPATIENT
Start: 2021-11-10

## 2021-11-10 NOTE — PATIENT INSTRUCTIONS
Continue making lifestyle changes that focus on good nutrition, regular exercise and stress management. Medication Plan: Continue current medication regimen aside from add [facility] REESE BARRAZA weekly and increase Topamax to 100 mg twice a day.  Send [employer] message wi you’re currently trying to lose weight, you might prefer putting this goal on hold for a short period. In this case, reset your goal and strive to maintain your weight instead.  No matter what you try, make sure your primary focus is on how you feel vs. wha They may take it personally, but you don’t have to. #3 Work on Your Mindset  Think about your relationship with food during the holidays. Why do we focus so much on it? Happiness comes from health and confidence.  It comes from being around people we lov

## 2021-11-10 NOTE — PROGRESS NOTES
Imelda Gallardo is a 63year old female presents today for follow-up on medical weight loss program for the treatment of overweight, obesity, or morbid obesity with associated prediabetes, Vitamin D deficiency, WANG.     S:  Current weight Wt Readings fr labored  CARDIO: RRR without murmur, normal S1 and S2 without clicks or gallops, no pedal edema.   GI: +BS  NEURO/MS: motor and sensory grossly intact  PSYCH: pleasant, cooperative, normal mood and affect    ASSESSMENT AND PLAN:  Encounter for therapeutic d next one.     Next steps to work on before next office visit include:       Stress, Food and the Holidays: Tips for Staying in Control  October 30, 2021 • Posted in [facility] • By Your [place] Matters Campaign      For some of us, the mere thought of foods you know will tempt you. If your grandmother bakes the BEST pumpkin pie every year, prepare yourself for it. What’s your plan? Either make an alternative dish or decide ahead of time that you will eat a small amount.  For example, take a couple savory a source of pleasure and togetherness. As the holiday season begins, reflect on your feelings toward food. If you tend to overeat, dig deep to ask yourself why.  It might help if you journal your thoughts or speak with a therapist.    Final Words of Soila

## 2021-12-05 PROBLEM — E66.01 MORBID OBESITY WITH BMI OF 40.0-44.9, ADULT (HCC): Status: RESOLVED | Noted: 2020-08-25 | Resolved: 2021-12-05

## 2021-12-08 DIAGNOSIS — E66.01 MORBID OBESITY WITH BMI OF 40.0-44.9, ADULT (HCC): ICD-10-CM

## 2021-12-08 DIAGNOSIS — Z51.81 ENCOUNTER FOR THERAPEUTIC DRUG MONITORING: Primary | ICD-10-CM

## 2021-12-08 DIAGNOSIS — R73.03 PREDIABETES: ICD-10-CM

## 2021-12-08 RX ORDER — SEMAGLUTIDE 0.25 MG/.5ML
0.25 INJECTION, SOLUTION SUBCUTANEOUS WEEKLY
Qty: 4 EACH | Refills: 0 | Status: CANCELLED | OUTPATIENT
Start: 2021-12-08

## 2021-12-08 RX ORDER — SEMAGLUTIDE 0.5 MG/.5ML
0.5 INJECTION, SOLUTION SUBCUTANEOUS WEEKLY
Qty: 2 ML | Refills: 0 | Status: SHIPPED | OUTPATIENT
Start: 2021-12-08 | End: 2022-01-03

## 2021-12-08 NOTE — TELEPHONE ENCOUNTER
Requesting Wegovy increase  LOV: 11/10/21  RTC: 3 months  Last Relevant Labs: na  Filled: 11/10/21 #4 with 0 refills    Future Appointments   Date Time Provider Romeo Pardo   2/9/2022  2:40 PM DELIA Garnett [facility]     Patient is

## 2022-01-03 DIAGNOSIS — Z51.81 ENCOUNTER FOR THERAPEUTIC DRUG MONITORING: Primary | ICD-10-CM

## 2022-01-03 DIAGNOSIS — R73.03 PREDIABETES: ICD-10-CM

## 2022-01-03 DIAGNOSIS — E66.01 MORBID OBESITY WITH BMI OF 40.0-44.9, ADULT (HCC): ICD-10-CM

## 2022-01-03 RX ORDER — SEMAGLUTIDE 1 MG/.5ML
1 INJECTION, SOLUTION SUBCUTANEOUS WEEKLY
Qty: 2 ML | Refills: 0 | Status: SHIPPED | OUTPATIENT
Start: 2022-01-03 | End: 2022-01-25

## 2022-01-31 DIAGNOSIS — E66.01 MORBID OBESITY WITH BMI OF 40.0-44.9, ADULT (HCC): ICD-10-CM

## 2022-01-31 DIAGNOSIS — Z51.81 ENCOUNTER FOR THERAPEUTIC DRUG MONITORING: ICD-10-CM

## 2022-01-31 DIAGNOSIS — R73.03 PREDIABETES: ICD-10-CM

## 2022-01-31 NOTE — TELEPHONE ENCOUNTER
Requesting Wegovy 1 mg  LOV: 11/10/21  RTC: 3 months  Last Relevant Labs: na  Filled: 1/3/22 #2ml with 0 refills    Future Appointments   Date Time Provider Romeo Pardo   2/9/2022  2:40 PM DELIA Parmar [facility] [address]

## 2022-02-03 RX ORDER — PHENTERMINE HYDROCHLORIDE 37.5 MG/1
37.5 TABLET ORAL EVERY MORNING
Qty: 30 TABLET | Refills: 0 | Status: SHIPPED | OUTPATIENT
Start: 2022-02-03 | End: 2022-03-18

## 2022-02-03 RX ORDER — SEMAGLUTIDE 1 MG/.5ML
INJECTION, SOLUTION SUBCUTANEOUS
Qty: 2 ML | Refills: 0 | OUTPATIENT
Start: 2022-02-03

## 2022-02-03 RX ORDER — SEMAGLUTIDE 1.7 MG/.75ML
1.7 INJECTION, SOLUTION SUBCUTANEOUS WEEKLY
Qty: 3 ML | Refills: 0 | Status: SHIPPED | OUTPATIENT
Start: 2022-02-03 | End: 2022-02-25

## 2022-02-03 NOTE — TELEPHONE ENCOUNTER
Patient had covid last week and has appt today at 2:40 - note sent to [facility] to see about changing to Video  Pended higher dose of wegovy and also due for phentemrine  [place] 1[place]22 [place]

## 2022-02-09 ENCOUNTER — TELEMEDICINE (OUTPATIENT)
Dept: INTERNAL MEDICINE CLINIC | Facility: CLINIC | Age: 62
End: 2022-02-09
Payer: COMMERCIAL

## 2022-02-09 DIAGNOSIS — Z51.81 ENCOUNTER FOR THERAPEUTIC DRUG MONITORING: Primary | ICD-10-CM

## 2022-02-09 DIAGNOSIS — E66.01 MORBID OBESITY WITH BMI OF 40.0-44.9, ADULT (HCC): ICD-10-CM

## 2022-02-09 DIAGNOSIS — R73.03 PREDIABETES: ICD-10-CM

## 2022-02-09 PROCEDURE — 99213 OFFICE O/P EST LOW 20 MIN: CPT | Performed by: NURSE PRACTITIONER

## 2022-03-01 RX ORDER — SEMAGLUTIDE 1.7 MG/.75ML
INJECTION, SOLUTION SUBCUTANEOUS
Qty: 3 ML | Refills: 0 | OUTPATIENT
Start: 2022-03-01

## 2022-03-14 RX ORDER — PHENTERMINE HYDROCHLORIDE 37.5 MG/1
37.5 TABLET ORAL EVERY MORNING
Qty: 30 TABLET | Refills: 0 | OUTPATIENT
Start: 2022-03-14

## 2022-03-17 DIAGNOSIS — Z51.81 ENCOUNTER FOR THERAPEUTIC DRUG MONITORING: ICD-10-CM

## 2022-03-17 DIAGNOSIS — E66.01 MORBID OBESITY WITH BMI OF 40.0-44.9, ADULT (HCC): ICD-10-CM

## 2022-03-18 DIAGNOSIS — E66.01 MORBID OBESITY WITH BMI OF 40.0-44.9, ADULT (HCC): ICD-10-CM

## 2022-03-18 DIAGNOSIS — Z51.81 ENCOUNTER FOR THERAPEUTIC DRUG MONITORING: ICD-10-CM

## 2022-03-18 RX ORDER — PHENTERMINE HYDROCHLORIDE 37.5 MG/1
37.5 TABLET ORAL EVERY MORNING
Qty: 30 TABLET | Refills: 2 | Status: SHIPPED | OUTPATIENT
Start: 2022-03-18 | End: 2022-06-22

## 2022-03-18 RX ORDER — PHENTERMINE HYDROCHLORIDE 37.5 MG/1
37.5 TABLET ORAL EVERY MORNING
Qty: 30 TABLET | Refills: 2 | OUTPATIENT
Start: 2022-03-18

## 2022-03-18 NOTE — TELEPHONE ENCOUNTER
Requesting Phentermine 37.5 mg  LOV: 2/9/22  RTC: 3 months  Last Relevant Labs: na  Filled: 2/3/22 #30 with 0 refills last [place] 2[place]22 [place]    No future appointments.

## 2022-05-19 NOTE — TELEPHONE ENCOUNTER
Requesting WEgovy 2.4 mg  LOV: 2/9/22  RTC: 3 months  Last Relevant Labs: na  Filled: 2/28/22 #3ml with 2 refills    No future appointments. No appt made since last one.   My chart wsent

## 2022-05-20 NOTE — TELEPHONE ENCOUNTER
Future Appointments   Date Time Provider Romeo Pardo   8/4/2022  2:20 PM DELIA Chinchilla [facility] [address]       Now scheduled

## 2022-05-21 RX ORDER — SEMAGLUTIDE 2.4 MG/.75ML
INJECTION, SOLUTION SUBCUTANEOUS
Qty: 3 ML | Refills: 2 | Status: SHIPPED | OUTPATIENT
Start: 2022-05-21

## 2022-06-20 DIAGNOSIS — Z51.81 ENCOUNTER FOR THERAPEUTIC DRUG MONITORING: ICD-10-CM

## 2022-06-20 DIAGNOSIS — E66.01 MORBID OBESITY WITH BMI OF 40.0-44.9, ADULT (HCC): ICD-10-CM

## 2022-06-20 RX ORDER — PHENTERMINE HYDROCHLORIDE 37.5 MG/1
TABLET ORAL
Qty: 30 TABLET | Refills: 0 | Status: CANCELLED | OUTPATIENT
Start: 2022-06-20

## 2022-06-21 RX ORDER — PHENTERMINE HYDROCHLORIDE 37.5 MG/1
TABLET ORAL
Qty: 30 TABLET | Refills: 1 | OUTPATIENT
Start: 2022-06-21

## 2022-06-21 NOTE — TELEPHONE ENCOUNTER
I have denied this. She was advised in 2/2022 that she needed to schedule in clinic. LOV in clinic over 6 months ago in 11/2021. She will have to remain off phentermine.  Can potentially check daily for any in clinic cancellations, as there are typically a couple on average, then she can be seen sooner than August.

## 2022-06-22 ENCOUNTER — OFFICE VISIT (OUTPATIENT)
Dept: INTERNAL MEDICINE CLINIC | Facility: CLINIC | Age: 62
End: 2022-06-22
Payer: COMMERCIAL

## 2022-06-22 VITALS
BODY MASS INDEX: 29.96 KG/M2 | WEIGHT: 182 LBS | HEIGHT: 65.5 IN | SYSTOLIC BLOOD PRESSURE: 114 MMHG | RESPIRATION RATE: 14 BRPM | HEART RATE: 78 BPM | DIASTOLIC BLOOD PRESSURE: 70 MMHG

## 2022-06-22 DIAGNOSIS — Z51.81 ENCOUNTER FOR THERAPEUTIC DRUG MONITORING: Primary | ICD-10-CM

## 2022-06-22 DIAGNOSIS — R73.03 PREDIABETES: ICD-10-CM

## 2022-06-22 DIAGNOSIS — E55.9 VITAMIN D DEFICIENCY: ICD-10-CM

## 2022-06-22 DIAGNOSIS — E66.01 MORBID OBESITY WITH BMI OF 40.0-44.9, ADULT (HCC): ICD-10-CM

## 2022-06-22 DIAGNOSIS — E78.00 HYPERCHOLESTEROLEMIA: ICD-10-CM

## 2022-06-22 DIAGNOSIS — R63.8 CRAVING FOR PARTICULAR FOOD: ICD-10-CM

## 2022-06-22 PROCEDURE — 3074F SYST BP LT 130 MM HG: CPT | Performed by: NURSE PRACTITIONER

## 2022-06-22 PROCEDURE — 3008F BODY MASS INDEX DOCD: CPT | Performed by: NURSE PRACTITIONER

## 2022-06-22 PROCEDURE — 3078F DIAST BP <80 MM HG: CPT | Performed by: NURSE PRACTITIONER

## 2022-06-22 PROCEDURE — 99213 OFFICE O/P EST LOW 20 MIN: CPT | Performed by: NURSE PRACTITIONER

## 2022-06-22 RX ORDER — PHENTERMINE HYDROCHLORIDE 37.5 MG/1
37.5 TABLET ORAL EVERY MORNING
Qty: 30 TABLET | Refills: 2 | Status: SHIPPED | OUTPATIENT
Start: 2022-06-22

## 2022-06-22 RX ORDER — BUPROPION HYDROCHLORIDE 150 MG/1
150 TABLET ORAL EVERY MORNING
Qty: 30 TABLET | Refills: 1 | Status: SHIPPED | OUTPATIENT
Start: 2022-06-22

## 2022-06-22 RX ORDER — NALTREXONE HYDROCHLORIDE 50 MG/1
25 TABLET, FILM COATED ORAL
Qty: 15 TABLET | Refills: 1 | Status: SHIPPED | OUTPATIENT
Start: 2022-06-22

## 2022-06-22 RX ORDER — TOPIRAMATE 100 MG/1
100 TABLET, FILM COATED ORAL 2 TIMES DAILY
Qty: 180 TABLET | Refills: 1 | Status: SHIPPED | OUTPATIENT
Start: 2022-06-22

## 2022-06-22 RX ORDER — VALACYCLOVIR HYDROCHLORIDE 1 G/1
2000 TABLET, FILM COATED ORAL 2 TIMES DAILY
COMMUNITY
Start: 2022-03-17

## 2022-08-04 ENCOUNTER — OFFICE VISIT (OUTPATIENT)
Dept: INTERNAL MEDICINE CLINIC | Facility: CLINIC | Age: 62
End: 2022-08-04
Payer: COMMERCIAL

## 2022-08-04 VITALS
RESPIRATION RATE: 16 BRPM | HEART RATE: 85 BPM | WEIGHT: 174 LBS | DIASTOLIC BLOOD PRESSURE: 72 MMHG | BODY MASS INDEX: 28.64 KG/M2 | SYSTOLIC BLOOD PRESSURE: 110 MMHG | HEIGHT: 65.5 IN

## 2022-08-04 DIAGNOSIS — E78.00 HYPERCHOLESTEROLEMIA: ICD-10-CM

## 2022-08-04 DIAGNOSIS — R73.03 PREDIABETES: ICD-10-CM

## 2022-08-04 DIAGNOSIS — E66.01 MORBID OBESITY WITH BMI OF 40.0-44.9, ADULT (HCC): ICD-10-CM

## 2022-08-04 DIAGNOSIS — Z51.81 ENCOUNTER FOR THERAPEUTIC DRUG MONITORING: Primary | ICD-10-CM

## 2022-08-04 DIAGNOSIS — E55.9 VITAMIN D DEFICIENCY: ICD-10-CM

## 2022-08-04 PROCEDURE — 3008F BODY MASS INDEX DOCD: CPT | Performed by: NURSE PRACTITIONER

## 2022-08-04 PROCEDURE — 99213 OFFICE O/P EST LOW 20 MIN: CPT | Performed by: NURSE PRACTITIONER

## 2022-08-04 PROCEDURE — 3078F DIAST BP <80 MM HG: CPT | Performed by: NURSE PRACTITIONER

## 2022-08-04 PROCEDURE — 3074F SYST BP LT 130 MM HG: CPT | Performed by: NURSE PRACTITIONER

## 2022-08-04 RX ORDER — PHENTERMINE HYDROCHLORIDE 37.5 MG/1
37.5 TABLET ORAL EVERY MORNING
Qty: 30 TABLET | Refills: 2 | Status: SHIPPED | OUTPATIENT
Start: 2022-08-04

## 2022-08-04 RX ORDER — SEMAGLUTIDE 2.4 MG/.75ML
2.4 INJECTION, SOLUTION SUBCUTANEOUS WEEKLY
Qty: 9 ML | Refills: 1 | Status: SHIPPED | OUTPATIENT
Start: 2022-08-04

## 2022-11-08 ENCOUNTER — TELEMEDICINE (OUTPATIENT)
Dept: INTERNAL MEDICINE CLINIC | Facility: CLINIC | Age: 62
End: 2022-11-08
Payer: COMMERCIAL

## 2022-11-08 ENCOUNTER — PATIENT MESSAGE (OUTPATIENT)
Dept: INTERNAL MEDICINE CLINIC | Facility: CLINIC | Age: 62
End: 2022-11-08

## 2022-11-08 DIAGNOSIS — R73.03 PREDIABETES: ICD-10-CM

## 2022-11-08 DIAGNOSIS — E78.00 HYPERCHOLESTEROLEMIA: ICD-10-CM

## 2022-11-08 DIAGNOSIS — Z51.81 ENCOUNTER FOR THERAPEUTIC DRUG MONITORING: Primary | ICD-10-CM

## 2022-11-08 DIAGNOSIS — E66.01 MORBID OBESITY WITH BMI OF 40.0-44.9, ADULT (HCC): ICD-10-CM

## 2022-11-08 RX ORDER — TIRZEPATIDE 5 MG/.5ML
5 INJECTION, SOLUTION SUBCUTANEOUS WEEKLY
Qty: 2 ML | Refills: 0 | Status: SHIPPED | OUTPATIENT
Start: 2022-11-08

## 2022-11-08 RX ORDER — PHENTERMINE HYDROCHLORIDE 37.5 MG/1
37.5 TABLET ORAL EVERY MORNING
Qty: 30 TABLET | Refills: 2 | Status: SHIPPED | OUTPATIENT
Start: 2022-11-08

## 2022-11-09 DIAGNOSIS — E66.01 MORBID OBESITY WITH BMI OF 40.0-44.9, ADULT (HCC): ICD-10-CM

## 2022-11-09 DIAGNOSIS — R63.8 CRAVING FOR PARTICULAR FOOD: ICD-10-CM

## 2022-11-09 DIAGNOSIS — Z51.81 ENCOUNTER FOR THERAPEUTIC DRUG MONITORING: ICD-10-CM

## 2022-11-10 RX ORDER — TOPIRAMATE 100 MG/1
TABLET, FILM COATED ORAL
Qty: 180 TABLET | Refills: 1 | OUTPATIENT
Start: 2022-11-10

## 2022-11-10 NOTE — TELEPHONE ENCOUNTER
Denied refill request to topamax 100 mg as it was sent for #180 with 1 refill on 6/22/22 to pharmacy requesting - would not be due to end of December.

## 2022-11-28 NOTE — TELEPHONE ENCOUNTER
Requesting Mounjaro  LOV: 11/8/22  RTC: 2-3 months  Last Relevant Labs: 11/10/21  Filled: 11/8/22 #2ml with 0 refills  Mounjaro 5 mg    No future appointments.

## 2022-11-29 RX ORDER — TIRZEPATIDE 7.5 MG/.5ML
7.5 INJECTION, SOLUTION SUBCUTANEOUS WEEKLY
Qty: 2 ML | Refills: 0 | Status: SHIPPED | OUTPATIENT
Start: 2022-11-29

## 2023-01-03 DIAGNOSIS — E66.01 MORBID OBESITY WITH BMI OF 40.0-44.9, ADULT (HCC): ICD-10-CM

## 2023-01-03 DIAGNOSIS — R63.8 CRAVING FOR PARTICULAR FOOD: ICD-10-CM

## 2023-01-03 DIAGNOSIS — Z51.81 ENCOUNTER FOR THERAPEUTIC DRUG MONITORING: ICD-10-CM

## 2023-01-04 NOTE — TELEPHONE ENCOUNTER
Patient also asking for Mounjaro  Last dose 11/29/22 7.5 mg 2ml  Ready to increase.     Future Appointments   Date Time Provider Romeo Pardo   2/27/2023 12:20 PM DELIA Guerrier [facility] [id number]

## 2023-01-04 NOTE — TELEPHONE ENCOUNTER
Requesting topiramate 100 mg  LOV: 11/8/22  RTC: 2-3 months  Last Relevant Labs: na  Filled: 6/22/22 #180 with 1 refills    No future appointments.     Pt reminded to schedule for Feb.

## 2023-01-06 RX ORDER — TOPIRAMATE 100 MG/1
TABLET, FILM COATED ORAL
Qty: 180 TABLET | Refills: 1 | Status: SHIPPED | OUTPATIENT
Start: 2023-01-06

## 2023-01-06 RX ORDER — TIRZEPATIDE 10 MG/.5ML
10 INJECTION, SOLUTION SUBCUTANEOUS WEEKLY
Qty: 2 ML | Refills: 0 | Status: SHIPPED | OUTPATIENT
Start: 2023-01-06

## 2023-01-10 ENCOUNTER — TELEPHONE (OUTPATIENT)
Dept: INTERNAL MEDICINE CLINIC | Facility: CLINIC | Age: 63
End: 2023-01-10

## 2023-01-10 NOTE — TELEPHONE ENCOUNTER
Received a fax form from [employer] to do [place] [facility] [place]. She is not diabetic.     My chart to patient to ask her to check other pharmacies to use original coupon to [place] [facility]

## 2023-01-10 NOTE — TELEPHONE ENCOUNTER
LOV via VV 2 months ago. I'm guessing she has been off any GLP-1 since? If so return to [facility]BRODY BARRAZA at . 25 mg weekly. In addition, she needs to schedule an in clinic visit in Feb or March as LOV in clinic was 8/2022. Bring food and fitness log to this visit for review.  Thanks

## 2023-02-07 DIAGNOSIS — E66.01 MORBID OBESITY WITH BMI OF 40.0-44.9, ADULT (HCC): ICD-10-CM

## 2023-02-07 DIAGNOSIS — Z51.81 ENCOUNTER FOR THERAPEUTIC DRUG MONITORING: ICD-10-CM

## 2023-02-10 DIAGNOSIS — E66.01 MORBID OBESITY WITH BMI OF 40.0-44.9, ADULT (HCC): ICD-10-CM

## 2023-02-10 DIAGNOSIS — Z51.81 ENCOUNTER FOR THERAPEUTIC DRUG MONITORING: ICD-10-CM

## 2023-02-11 DIAGNOSIS — Z51.81 ENCOUNTER FOR THERAPEUTIC DRUG MONITORING: ICD-10-CM

## 2023-02-11 DIAGNOSIS — E66.01 MORBID OBESITY WITH BMI OF 40.0-44.9, ADULT (HCC): ICD-10-CM

## 2023-02-11 RX ORDER — PHENTERMINE HYDROCHLORIDE 37.5 MG/1
TABLET ORAL
Qty: 30 TABLET | Refills: 0 | Status: SHIPPED | OUTPATIENT
Start: 2023-02-11

## 2023-02-13 RX ORDER — PHENTERMINE HYDROCHLORIDE 37.5 MG/1
37.5 TABLET ORAL EVERY MORNING
Qty: 30 TABLET | Refills: 2 | OUTPATIENT
Start: 2023-02-13

## 2023-02-20 ENCOUNTER — OFFICE VISIT (OUTPATIENT)
Dept: INTERNAL MEDICINE CLINIC | Facility: CLINIC | Age: 63
End: 2023-02-20
Payer: COMMERCIAL

## 2023-02-20 VITALS
WEIGHT: 170 LBS | OXYGEN SATURATION: 97 % | RESPIRATION RATE: 16 BRPM | HEART RATE: 93 BPM | HEIGHT: 65.5 IN | SYSTOLIC BLOOD PRESSURE: 110 MMHG | BODY MASS INDEX: 27.98 KG/M2 | DIASTOLIC BLOOD PRESSURE: 70 MMHG

## 2023-02-20 DIAGNOSIS — E66.3 OVERWEIGHT (BMI 25.0-29.9): Primary | ICD-10-CM

## 2023-02-20 DIAGNOSIS — Z86.39 HISTORY OF MORBID OBESITY: ICD-10-CM

## 2023-02-20 DIAGNOSIS — Z51.81 ENCOUNTER FOR THERAPEUTIC DRUG MONITORING: ICD-10-CM

## 2023-02-20 DIAGNOSIS — E66.01 MORBID OBESITY WITH BMI OF 40.0-44.9, ADULT (HCC): ICD-10-CM

## 2023-02-20 PROCEDURE — 3008F BODY MASS INDEX DOCD: CPT | Performed by: NURSE PRACTITIONER

## 2023-02-20 PROCEDURE — 3078F DIAST BP <80 MM HG: CPT | Performed by: NURSE PRACTITIONER

## 2023-02-20 PROCEDURE — 3074F SYST BP LT 130 MM HG: CPT | Performed by: NURSE PRACTITIONER

## 2023-02-20 PROCEDURE — 99213 OFFICE O/P EST LOW 20 MIN: CPT | Performed by: NURSE PRACTITIONER

## 2023-02-20 RX ORDER — SEMAGLUTIDE 2.4 MG/.75ML
2.4 INJECTION, SOLUTION SUBCUTANEOUS WEEKLY
Qty: 9 ML | Refills: 1 | Status: SHIPPED | OUTPATIENT
Start: 2023-02-20

## 2023-02-20 RX ORDER — PHENTERMINE HYDROCHLORIDE 37.5 MG/1
37.5 TABLET ORAL EVERY MORNING
Qty: 30 TABLET | Refills: 2 | Status: SHIPPED | OUTPATIENT
Start: 2023-02-20

## 2023-02-20 RX ORDER — SEMAGLUTIDE 2.4 MG/.75ML
INJECTION, SOLUTION SUBCUTANEOUS
COMMUNITY
Start: 2023-02-02 | End: 2023-02-20

## 2023-05-25 ENCOUNTER — PATIENT MESSAGE (OUTPATIENT)
Dept: INTERNAL MEDICINE CLINIC | Facility: CLINIC | Age: 63
End: 2023-05-25

## 2023-05-30 ENCOUNTER — TELEMEDICINE (OUTPATIENT)
Dept: INTERNAL MEDICINE CLINIC | Facility: CLINIC | Age: 63
End: 2023-05-30
Payer: COMMERCIAL

## 2023-05-30 DIAGNOSIS — Z51.81 ENCOUNTER FOR THERAPEUTIC DRUG MONITORING: Primary | ICD-10-CM

## 2023-05-30 DIAGNOSIS — R63.8 CRAVING FOR PARTICULAR FOOD: ICD-10-CM

## 2023-05-30 DIAGNOSIS — E66.3 OVERWEIGHT (BMI 25.0-29.9): ICD-10-CM

## 2023-05-30 DIAGNOSIS — Z86.39 HISTORY OF MORBID OBESITY: ICD-10-CM

## 2023-05-30 PROCEDURE — 99213 OFFICE O/P EST LOW 20 MIN: CPT | Performed by: NURSE PRACTITIONER

## 2023-05-30 RX ORDER — PHENTERMINE HYDROCHLORIDE 37.5 MG/1
37.5 TABLET ORAL EVERY MORNING
Qty: 30 TABLET | Refills: 2 | Status: SHIPPED | OUTPATIENT
Start: 2023-05-30

## 2023-05-30 RX ORDER — TOPIRAMATE 100 MG/1
50 TABLET, FILM COATED ORAL 2 TIMES DAILY
Qty: 90 TABLET | Refills: 0 | COMMUNITY
Start: 2023-05-30

## 2023-06-12 RX ORDER — PHENTERMINE HYDROCHLORIDE 37.5 MG/1
37.5 TABLET ORAL
Qty: 90 TABLET | Refills: 0 | Status: SHIPPED | OUTPATIENT
Start: 2023-06-12

## 2023-06-12 RX ORDER — TOPIRAMATE 100 MG/1
50 TABLET, FILM COATED ORAL 2 TIMES DAILY
Qty: 90 TABLET | Refills: 0 | Status: SHIPPED | OUTPATIENT
Start: 2023-06-12

## 2023-06-12 NOTE — TELEPHONE ENCOUNTER
Last visit 5/30/23  Wants phentermine and topamax through mail order - sent locally. Pended 90 day if okay please sign.

## 2023-06-19 DIAGNOSIS — E66.3 OVERWEIGHT (BMI 25.0-29.9): ICD-10-CM

## 2023-06-19 DIAGNOSIS — Z51.81 ENCOUNTER FOR THERAPEUTIC DRUG MONITORING: ICD-10-CM

## 2023-06-19 DIAGNOSIS — Z86.39 HISTORY OF MORBID OBESITY: ICD-10-CM

## 2023-06-19 NOTE — TELEPHONE ENCOUNTER
Requesting wegovy  LOV: 2/20/23  RTC: 3 months  Filled: 2/20/23 #9ml with 1 refills    No future appointments.

## 2023-08-14 RX ORDER — SEMAGLUTIDE 2.4 MG/.75ML
INJECTION, SOLUTION SUBCUTANEOUS
Qty: 9 ML | Refills: 1 | OUTPATIENT
Start: 2023-08-14

## 2023-09-21 NOTE — TELEPHONE ENCOUNTER
Requesting   Requested Prescriptions     Pending Prescriptions Disp Refills    PHENTERMINE HCL 37.5 MG Oral Tab [Pharmacy Med Name: PHENTERMINE HCL TABS 37.5MG] 90 tablet 0     Sig: TAKE 1 TABLET EVERY MORNING BEFORE BREAKFAST     LOV: 5/30/23  RTC: 3 months  Filled: 6/12/23 #90 with 0 refills    Future Appointments   Date Time Provider Romeo Pardo   11/13/2023  3:20 PM DELIA Garibay [facility] [id number]

## 2023-09-22 RX ORDER — PHENTERMINE HYDROCHLORIDE 37.5 MG/1
37.5 TABLET ORAL EVERY MORNING
Qty: 90 TABLET | Refills: 0 | Status: SHIPPED | OUTPATIENT
Start: 2023-09-22

## 2023-10-03 DIAGNOSIS — E66.3 OVERWEIGHT (BMI 25.0-29.9): ICD-10-CM

## 2023-10-03 DIAGNOSIS — Z51.81 ENCOUNTER FOR THERAPEUTIC DRUG MONITORING: ICD-10-CM

## 2023-10-03 DIAGNOSIS — Z86.39 HISTORY OF MORBID OBESITY: ICD-10-CM

## 2023-10-03 RX ORDER — SEMAGLUTIDE 2.4 MG/.75ML
2.4 INJECTION, SOLUTION SUBCUTANEOUS WEEKLY
Qty: 9 ML | Refills: 0 | Status: SHIPPED | OUTPATIENT
Start: 2023-10-03

## 2023-10-03 RX ORDER — PHENTERMINE HYDROCHLORIDE 37.5 MG/1
37.5 TABLET ORAL EVERY MORNING
Qty: 90 TABLET | Refills: 0 | OUTPATIENT
Start: 2023-10-03

## 2023-10-03 NOTE — TELEPHONE ENCOUNTER
Requesting   Requested Prescriptions     Pending Prescriptions Disp Refills    WEGOVY 2.4 MG/0.75ML Subcutaneous Solution Auto-injector [Pharmacy Med Name: Onnie Lime 2.4MG/0.75ML PF PEN INJ] 9 mL 1     Sig: ADMINISTER 2.4MG UNDER THE SKIN 1 TIME A WEEK     LOV: 5/30/23  RTC:3 months  Filled: 2/20/23 #9 with 1 refills    Future Appointments   Date Time Provider Romeo Pardo   11/13/2023  3:20 PM DELIA Caldera [facility] [id number]

## 2023-11-13 ENCOUNTER — OFFICE VISIT (OUTPATIENT)
Dept: INTERNAL MEDICINE CLINIC | Facility: CLINIC | Age: 63
End: 2023-11-13
Payer: COMMERCIAL

## 2023-11-13 VITALS
OXYGEN SATURATION: 100 % | BODY MASS INDEX: 30.29 KG/M2 | WEIGHT: 184 LBS | HEIGHT: 65.5 IN | SYSTOLIC BLOOD PRESSURE: 126 MMHG | DIASTOLIC BLOOD PRESSURE: 86 MMHG | HEART RATE: 82 BPM | RESPIRATION RATE: 18 BRPM

## 2023-11-13 DIAGNOSIS — R63.8 CRAVING FOR PARTICULAR FOOD: ICD-10-CM

## 2023-11-13 DIAGNOSIS — E66.9 CLASS 1 OBESITY WITH SERIOUS COMORBIDITY AND BODY MASS INDEX (BMI) OF 30.0 TO 30.9 IN ADULT, UNSPECIFIED OBESITY TYPE: ICD-10-CM

## 2023-11-13 DIAGNOSIS — R73.03 PREDIABETES: ICD-10-CM

## 2023-11-13 DIAGNOSIS — Z51.81 ENCOUNTER FOR THERAPEUTIC DRUG MONITORING: Primary | ICD-10-CM

## 2023-11-13 DIAGNOSIS — Z86.39 HISTORY OF MORBID OBESITY: ICD-10-CM

## 2023-11-13 PROBLEM — E66.811 CLASS 1 OBESITY WITH SERIOUS COMORBIDITY AND BODY MASS INDEX (BMI) OF 30.0 TO 30.9 IN ADULT: Status: ACTIVE | Noted: 2023-11-13

## 2023-11-13 PROCEDURE — 3079F DIAST BP 80-89 MM HG: CPT | Performed by: NURSE PRACTITIONER

## 2023-11-13 PROCEDURE — 3074F SYST BP LT 130 MM HG: CPT | Performed by: NURSE PRACTITIONER

## 2023-11-13 PROCEDURE — 99214 OFFICE O/P EST MOD 30 MIN: CPT | Performed by: NURSE PRACTITIONER

## 2023-11-13 PROCEDURE — 3008F BODY MASS INDEX DOCD: CPT | Performed by: NURSE PRACTITIONER

## 2023-11-13 RX ORDER — PHENTERMINE HYDROCHLORIDE 37.5 MG/1
37.5 TABLET ORAL EVERY MORNING
Qty: 90 TABLET | Refills: 0 | Status: SHIPPED | OUTPATIENT
Start: 2023-11-13

## 2023-11-13 RX ORDER — SEMAGLUTIDE 2.4 MG/.75ML
2.4 INJECTION, SOLUTION SUBCUTANEOUS WEEKLY
Qty: 9 ML | Refills: 1 | Status: SHIPPED | OUTPATIENT
Start: 2023-11-13

## 2023-11-13 RX ORDER — TOPIRAMATE 50 MG/1
50 TABLET, FILM COATED ORAL 2 TIMES DAILY
Qty: 180 TABLET | Refills: 1 | Status: SHIPPED | OUTPATIENT
Start: 2023-11-13

## 2023-11-13 RX ORDER — BUPROPION HYDROCHLORIDE 150 MG/1
150 TABLET ORAL EVERY MORNING
Qty: 30 TABLET | Refills: 2 | Status: SHIPPED | OUTPATIENT
Start: 2023-11-13

## 2024-02-13 DIAGNOSIS — Z51.81 ENCOUNTER FOR THERAPEUTIC DRUG MONITORING: ICD-10-CM

## 2024-02-13 DIAGNOSIS — Z86.39 HISTORY OF MORBID OBESITY: ICD-10-CM

## 2024-02-13 DIAGNOSIS — E66.9 CLASS 1 OBESITY WITH SERIOUS COMORBIDITY AND BODY MASS INDEX (BMI) OF 30.0 TO 30.9 IN ADULT, UNSPECIFIED OBESITY TYPE: ICD-10-CM

## 2024-02-13 NOTE — TELEPHONE ENCOUNTER
Requesting   Requested Prescriptions     Pending Prescriptions Disp Refills    BUPROPION  MG Oral Tablet 24 Hr [Pharmacy Med Name: BUPROPION XL 150MG TABLETS (24 H)] 30 tablet 2     Sig: TAKE 1 TABLET(150 MG) BY MOUTH EVERY MORNING     LOV: 11/13/23  RTC: 2 months  Filled: 11/13/23 #30 with 2 refills    Future Appointments   Date Time Provider Department Center   4/2/2024  1:40 PM Dayana Rock APRN [facility]

## 2024-02-17 RX ORDER — BUPROPION HYDROCHLORIDE 150 MG/1
150 TABLET ORAL EVERY MORNING
Qty: 30 TABLET | Refills: 1 | Status: SHIPPED | OUTPATIENT
Start: 2024-02-17

## 2024-04-02 ENCOUNTER — TELEMEDICINE (OUTPATIENT)
Dept: INTERNAL MEDICINE CLINIC | Facility: CLINIC | Age: 64
End: 2024-04-02
Payer: COMMERCIAL

## 2024-04-02 DIAGNOSIS — R63.8 CRAVING FOR PARTICULAR FOOD: ICD-10-CM

## 2024-04-02 DIAGNOSIS — Z51.81 ENCOUNTER FOR THERAPEUTIC DRUG MONITORING: Primary | ICD-10-CM

## 2024-04-02 DIAGNOSIS — E66.9 CLASS 1 OBESITY WITH SERIOUS COMORBIDITY AND BODY MASS INDEX (BMI) OF 30.0 TO 30.9 IN ADULT, UNSPECIFIED OBESITY TYPE: ICD-10-CM

## 2024-04-02 DIAGNOSIS — Z86.39 HISTORY OF MORBID OBESITY: ICD-10-CM

## 2024-04-02 PROCEDURE — 99213 OFFICE O/P EST LOW 20 MIN: CPT | Performed by: NURSE PRACTITIONER

## 2024-04-02 RX ORDER — PHENTERMINE HYDROCHLORIDE 37.5 MG/1
37.5 TABLET ORAL EVERY MORNING
Qty: 90 TABLET | Refills: 0 | Status: SHIPPED | OUTPATIENT
Start: 2024-04-02

## 2024-04-02 RX ORDER — BUPROPION HYDROCHLORIDE 150 MG/1
150 TABLET ORAL EVERY MORNING
Qty: 90 TABLET | Refills: 1 | Status: SHIPPED | OUTPATIENT
Start: 2024-04-02

## 2024-04-02 RX ORDER — TOPIRAMATE 100 MG/1
100 TABLET, FILM COATED ORAL 2 TIMES DAILY
Qty: 180 TABLET | Refills: 1 | Status: SHIPPED | OUTPATIENT
Start: 2024-04-02

## 2024-04-02 NOTE — PROGRESS NOTES
Located within [facility] Weight Management follow up via video visit:    Subjective    This visit is conducted using Telemedicine with live, interactive video and audio.    Chief Complaint:  Routine follow up visit for lifestyle and medical management for overweight, obesity, or morbid obesity. LOV in clinic weight: 184#.    PMH reviewed. Bariatric surgery planned or hx of surgery in the past: no.    HPI:   Nai Colindres is a 63 year old female who is being followed up today for lifestyle and medical management as deemed appropriate for overweight, obesity or morbid obesity. Patient reports weight loss monitoring at home via scale with a weight of 179#. This appears to be a weight loss since LOV 5 months ago in clinic. Patient has been consistent with medication and tolerating added Wellbutrin XL.    Questions/Concerns/Comments since LOV: Feeling more cravings and not as full when eating. Stress remains elevated, but likely to improve soon with the sale of their home and move. No tracking of nutrition but feels daily meals are fairly consistent. Recently returned from vacation which contributed to excess calorie intake d/t eating out.    Lifestyle/Social Hx Reviewed:    [facility] Follow Up    Question 4/2/2024 12:48 PM CDT - Filed by Patient   Please describe a success moment: Got in a smaller size   Please describe a challenging moment/needs for improvement: Traveling with people who love to eat bad foods   Please complete this 24 hour food journal, listing everything you had to eat in the past day. Include the average time of day you ate these meals at    List foods, qty and prep for breakfast: Egg coffee cream   List foods, qty and prep for lunch. Egg salad sandwich on toast with cantelope   List foods, qty and prep for dinner. Chicken breast beans salad with oil and vinaigrette   List foods, qty and prep for snacks. Cheese stick orange   List the types and qty of fluids consumed Water 7 glasses   On  average, how many meals did you eat out per week? 3   Exercise    How many days per week are you active or exercise 2   On average, how many days were anaerobic (strength/resistance) exercises performed? 2   On average, how many days were aerobic (cardio) exercises performed? 2   Perceived level of exertion on a scale of 1-5, with 5 being very intense: 2   Stress    Average stress level on a scale of 1-10, with 10 being extremely stressed: 2   If greater than 5/1O how would you grade your coping mechanisms?    Sleep hours and integrity    How many hours of uninterrupted sleep do you get a night: 8   Do you feel rested in the morning: Yes   If no, what may have been disrupting your sleep?    Please list any goal(s) for your next visit Possibly changing up meds       ROS  General: feeling well, denies fatigue  CV: denies cp, palpitations  Resp: denies sob  GI: denies abdominal pain. Denies N/V/D/C.  Neuro: denies paresthesia or cognitive changes  Psych: denies any mood changes    Physical Exam:  >   BP Readings from Last 3 Encounters:   11/13/23 126/86   02/20/23 110/70   08/04/22 110/72       Home weight: see above  Home BP: not available  Home blood sugars: n/a    General: patient speaking in full sentences, no increased work of breathing. alert, appears stated age, cooperative, and no distress  HENT: normocephalic  Resp: Breathing is non labored  Psych: patient appears cheerful, smiling, making good eye contact    Diagnoses and all orders for this visit:    Encounter for therapeutic drug monitoring  -     topiramate 100 MG Oral Tab; Take 1 tablet (100 mg total) by mouth 2 (two) times daily.  -     Phentermine HCl 37.5 MG Oral Tab; Take 1 tablet (37.5 mg total) by mouth every morning.  -     buPROPion  MG Oral Tablet 24 Hr; Take 1 tablet (150 mg total) by mouth every morning.    Class 1 obesity with serious comorbidity and body mass index (BMI) of 30.0 to 30.9 in adult, unspecified obesity type  -      topiramate 100 MG Oral Tab; Take 1 tablet (100 mg total) by mouth 2 (two) times daily.  -     Phentermine HCl 37.5 MG Oral Tab; Take 1 tablet (37.5 mg total) by mouth every morning.  -     buPROPion  MG Oral Tablet 24 Hr; Take 1 tablet (150 mg total) by mouth every morning.    History of morbid obesity  -     topiramate 100 MG Oral Tab; Take 1 tablet (100 mg total) by mouth 2 (two) times daily.  -     Phentermine HCl 37.5 MG Oral Tab; Take 1 tablet (37.5 mg total) by mouth every morning.  -     buPROPion  MG Oral Tablet 24 Hr; Take 1 tablet (150 mg total) by mouth every morning.    Craving for particular food  -     topiramate 100 MG Oral Tab; Take 1 tablet (100 mg total) by mouth 2 (two) times daily.        Patient Instructions   Continue making lifestyle changes that focus on good nutrition, regular exercise and stress management.    Medication Plan: Continue current medication regimen aside from increase Topamax to 100 mg twice a day.    Next steps to work on before next office visit include: Start tracking nutrition to remain accountable for both quality and quantity of food. Recommend setting weekly weight loss goal to 1-1.5# and do not add your exercise in as this will overestimate your daily calories. Use a food scale, measuring cups and spoons. Purchase serving dishes that are 1/4 cup, 1/2 cup and 1 cup servings. Use an jessica such as [employer], Lose[employer]! Or [employer]ary to provide accountably, structure and support. Consider a consult or follow up with one of our dieticians to help modify and/or support your nutrition plan for weight loss as well as maintenance.    Why Do We Experience Hunger and Have Cravings?  December 18, 2020  Posted in Blog, Nutrition  By Your [employer] Mat[employer] Campaign    Hunger and cravings  Hunger and cravings are two key factors that drive our decision to eat and influence our food choices. They are feelings and experiences that we get as a result of the complex signaling  between our stomach and brain:    Gut-brain hunger signals (when your stomach tells your brain that you are full or hungry)  The brain reward process (feeling pleasure or alverto from food)  Cognitive control processes (conscious thoughts, feelings, mindset, decisions, etc.)    The Role of Biology  Many people who struggle with weight have biological factors at play that can affect their relationship with food and food-related decisions. No two people experience hunger and respond to it the same way. It is important that the role of biology in hunger is more widely recognized to help others understand how complicated weight issues can be.    Gut-Brain Hunger Signals  Why do we get hungry? Your digestive tract has receptors that can detect when nutrients are present. When you don’t have enough nutrients, these receptors tell your stomach to produce hunger hormones that send a signal to your hypothalamus - the part of your brain that controls hunger and thirst. As a result, you get hungry and want to eat.    When we gain weight, our brains become less sensitive to signals that control how responsive our brain is to nutrients. For example, someone at a higher weight may not have as much leptin (the fullness hormone) and is more likely to feel hungry. That’s why some people affected by obesity may be more biologically likely to struggle with overeating.    Food is Rewarding  Another key element of hunger is psychology. Food is very rewarding and its effects on the brain can make it hard to resist temptation when environmental cues are everywhere. This can also cause you to eat even when you’re not hungry.    Here are two key parts of the brain’s reward system:    The Endocannabinoid System - A system of signaling molecules whose main function is to help the body maintain homeostasis (a stable internal state). It affects mood, appetite, stress, sleep and more.    The Opioid System - A system of the brain that controls pain,  reward and addicting behaviors (like stress-eating or binge eating).  Both of these systems can have powerful effects on your desire for food, your cravings, and your enjoyment of food. When we eat foods that are high in fat, sugar and salt, these systems increase activity in the brain that ramps up your desire to eat.    Cognitive Control  The third key element of hunger and cravings involve your conscious control. Cognitive control helps you decide when not to respond to food cues and hunger signals. However, it can be tough to manage when you are really hungry, stressed or upset in any way.    When we are in a better mood and not starving, it’s easier to ignore food cues and cravings. When we are stressed or depressed, we are more likely to eat based on our feelings. This is called emotional eating or stress eating.    Managing Hunger and Cravings  So, what does all this mean and how can you manage hunger and cravings? Check out the full article (https://www.obesityaction.org/community/hunger-why-do-we-have-cravings-and-what-can-dv-oq-vzwpi-them/) from the [employer],  of the Your [employer] Matters Campaign.       Comfort Foods - Why do they make us happy?    by Sherly Winslow, PhD, HSPP    [facility] @ www.obesityaction.org Summer 2013 Resources    “There, there. Just let me bake you some cookies.” “Don’t cry. We’ll stop and get some ice cream, and it will all be better.”    Do these sound familiar? Maybe statements that you heard as a young child? They were innocent words and very genuine actions on the part of our caregivers to express love and concern to us when we were hurting. The way they knew to do this was through food - and not just any food. Usually, the foods offered were foods rich in fats and carbohydrates - the foods that we have come to term “comfort foods.” In this way, food has come to be used as a special type of medicine, as an anti-depressant of types, to cure the mood that ails  us. However, such patterns can become very problematic, especially if it is a habitual pattern causing excessive weight gain.    Scientists continue to research the effect that the chemical composition of foods may have on our moods. While such research is very valuable, I want to focus on the psychology, not the biology, of comfort food.    Food and Customs  All cultures have customs around food. In my childhood, money was scarce and when there was some kind of special occasion, it meant that we would use limited resources to buy special foods. It meant that we were being treated in a special way. Birthdays meant choosing a special meal and a type of cake and ice cream (within a budget). Funerals involved having food brought to the bereaved. The  ritual is one of special interest to this topic. The message is quite obvious: “I hope this food makes you happier.” Again, nothing is ill-intended and the gift is given with much love and care. However, it is another reinforcement of the use of food to make us “feel better.”    We are given messages early in our lives and then reinforced throughout our lives about how food can make us feel different, to feel better. Because we equate food with happiness, we continue to turn to food for such comfort. And we do feel happy or better, albeit temporarily.    Changing Patterns  1. The key to changing this lifelong pattern of equating food with happiness is to first be aware. Take some time to reflect on how food was used through your life and its connection to emotional states for you.    2. Next, take some time to reflect on your own emotional states. You may keep a feeling journal and write down how you felt each day. In reflecting, you will be more aware of the connection of food to your feelings in the past and more aware of your feelings in the present.    3. Then, the work begins. Take each emotion connected to food and create a list of other things you may do to  tend to that emotion. For instance, you may have “sadness” as one emotion that has been connected to eating. Alternative ways to get comfort when sad may be:    Talk to a friend  Cry  Journal your feelings  Listen to music  Write a song or a poem    4. By creating alternatives, you begin to see how you can break the cycle of comfort eating.    5. Post this list of alternatives in a place that you are likely to see it regularly. Consult it. Add to it as needed. Or grace things off that you have tried that maybe didn’t work.    Food and Behavioral Conditioning  One important means of understanding the connection between food and behavior is understanding how we are conditioned to have a certain response when we are exposed repeatedly to a stimuli. In this case, when we have been told repeatedly that we can feel better with food (the stimuli), we believe that we do indeed feel better (the response) when we eat cakes and cookies and such. However, what we don’t think about is the other stimulus, the care, the concern, the love that came with the food and how that made us feel. In other words, we may have attributed our response (feeling better) to the wrong stimulus (food) rather than the one that actually did make us feel better, which was the love we felt.    So maybe it is not the food that makes us feel happy. Maybe it is the memory of these people expressing their love and care to us. Maybe that is what really makes us happy. Patients often tell me that they eat when sad, lonely or bored. They are seeking comfort.    They want to feel “full” or “satisfied” and the food does offer that physical release. However, the true comfort that they seek cannot be found in carbohydrates or fat, but it can be found in the feeling of belonging, of connecting with others, of being creative and inspired.    Conclusion  Remember, you have had a lifetime of creating a pattern of using comfort foods, so it is not likely to change  quickly. Make sure to give yourself some time to make these changes. When you are able to change the relationship with food, you are able to change your relationships with others, and you just might find more satisfying and healthier relationships.      Return in about 3 months (around 7/2/2024) for weight management via clinic.    DOCUMENTATION OF TIME SPENT: Code selection for this visit was based on time spent : 20 minutes on date of service in preparing to see the patient, obtaining and/or reviewing separately obtained history, performing a medically appropriate examination, counseling and educating the patient/family/caregiver, ordering medications or testing, referring and communicating with other healthcare providers, documenting clinical information in the electronic medical record, independently interpreting results and communicating results to the patient/family/caregiver and care coordination with the patient's other providers.

## 2024-04-02 NOTE — PATIENT INSTRUCTIONS
Continue making lifestyle changes that focus on good nutrition, regular exercise and stress management.    Medication Plan: Continue current medication regimen aside from increase Topamax to 100 mg twice a day.    Next steps to work on before next office visit include: Start tracking nutrition to remain accountable for both quality and quantity of food. Recommend setting weekly weight loss goal to 1-1.5# and do not add your exercise in as this will overestimate your daily calories. Use a food scale, measuring cups and spoons. Purchase serving dishes that are 1/4 cup, 1/2 cup and 1 cup servings. Use an jessica such as [employer], [employer] to provide accountably, structure and support. Consider a consult or follow up with one of our dieticians to help modify and/or support your nutrition plan for weight loss as well as maintenance.    Why Do We Experience Hunger and Have Cravings?  December 18, 2020  Posted in Blog, Nutrition  By [employer] [facility]    Hunger and cravings  Hunger and cravings are two key factors that drive our decision to eat and influence our food choices. They are feelings and experiences that we get as a result of the complex signaling between our stomach and brain:    Gut-brain hunger signals (when your stomach tells your brain that you are full or hungry)  The brain reward process (feeling pleasure or alverto from food)  Cognitive control processes (conscious thoughts, feelings, mindset, decisions, etc.)    The Role of Biology  Many people who struggle with weight have biological factors at play that can affect their relationship with food and food-related decisions. No two people experience hunger and respond to it the same way. It is important that the role of biology in hunger is more widely recognized to help others understand how complicated weight issues can be.    Gut-Brain Hunger Signals  Why do we get hungry? Your digestive tract has receptors that can detect when  nutrients are present. When you don’t have enough nutrients, these receptors tell your stomach to produce hunger hormones that send a signal to your hypothalamus - the part of your brain that controls hunger and thirst. As a result, you get hungry and want to eat.    When we gain weight, our brains become less sensitive to signals that control how responsive our brain is to nutrients. For example, someone at a higher weight may not have as much leptin (the fullness hormone) and is more likely to feel hungry. That’s why some people affected by obesity may be more biologically likely to struggle with overeating.    Food is Rewarding  Another key element of hunger is psychology. Food is very rewarding and its effects on the brain can make it hard to resist temptation when environmental cues are everywhere. This can also cause you to eat even when you’re not hungry.    Here are two key parts of the brain’s reward system:    The Endocannabinoid System - A system of signaling molecules whose main function is to help the body maintain homeostasis (a stable internal state). It affects mood, appetite, stress, sleep and more.    The Opioid System - A system of the brain that controls pain, reward and addicting behaviors (like stress-eating or binge eating).  Both of these systems can have powerful effects on your desire for food, your cravings, and your enjoyment of food. When we eat foods that are high in fat, sugar and salt, these systems increase activity in the brain that ramps up your desire to eat.    Cognitive Control  The third key element of hunger and cravings involve your conscious control. Cognitive control helps you decide when not to respond to food cues and hunger signals. However, it can be tough to manage when you are really hungry, stressed or upset in any way.    When we are in a better mood and not starving, it’s easier to ignore food cues and cravings. When we are stressed or depressed, we are more likely  to eat based on our feelings. This is called emotional eating or stress eating.    Managing Hunger and Cravings  So, what does all this mean and how can you manage hunger and cravings? Check out the full article (https://www.obesityaction.org/community/hunger-why-do-we-have-cravings-and-what-can-dn-hh-bhifs-them/) from the [employer],  of the [employer].       Comfort Foods - Why do they make us happy?    by Sherly Winslow, PhD, [facility]    [facility] @ www.obesityaction.org Summer 2013 Resources    “There, there. Just let me bake you some cookies.” “Don’t cry. We’ll stop and get some ice cream, and it will all be better.”    Do these sound familiar? Maybe statements that you heard as a young child? They were innocent words and very genuine actions on the part of our caregivers to express love and concern to us when we were hurting. The way they knew to do this was through food - and not just any food. Usually, the foods offered were foods rich in fats and carbohydrates - the foods that we have come to term “comfort foods.” In this way, food has come to be used as a special type of medicine, as an anti-depressant of types, to cure the mood that ails us. However, such patterns can become very problematic, especially if it is a habitual pattern causing excessive weight gain.    Scientists continue to research the effect that the chemical composition of foods may have on our moods. While such research is very valuable, I want to focus on the psychology, not the biology, of comfort food.    Food and Customs  All cultures have customs around food. In my childhood, money was scarce and when there was some kind of special occasion, it meant that we would use limited resources to buy special foods. It meant that we were being treated in a special way. Birthdays meant choosing a special meal and a type of cake and ice cream (within a budget). Funerals involved having food brought to the bereaved.  The  ritual is one of special interest to this topic. The message is quite obvious: “I hope this food makes you happier.” Again, nothing is ill-intended and the gift is given with much love and care. However, it is another reinforcement of the use of food to make us “feel better.”    We are given messages early in our lives and then reinforced throughout our lives about how food can make us feel different, to feel better. Because we equate food with happiness, we continue to turn to food for such comfort. And we do feel happy or better, albeit temporarily.    Changing Patterns  1. The key to changing this lifelong pattern of equating food with happiness is to first be aware. Take some time to reflect on how food was used through your life and its connection to emotional states for you.    2. Next, take some time to reflect on your own emotional states. You may keep a feeling journal and write down how you felt each day. In reflecting, you will be more aware of the connection of food to your feelings in the past and more aware of your feelings in the present.    3. Then, the work begins. Take each emotion connected to food and create a list of other things you may do to tend to that emotion. For instance, you may have “sadness” as one emotion that has been connected to eating. Alternative ways to get comfort when sad may be:    Talk to a friend  Cry  Journal your feelings  Listen to music  Write a song or a poem    4. By creating alternatives, you begin to see how you can break the cycle of comfort eating.    5. Post this list of alternatives in a place that you are likely to see it regularly. Consult it. Add to it as needed. Or grace things off that you have tried that maybe didn’t work.    Food and Behavioral Conditioning  One important means of understanding the connection between food and behavior is understanding how we are conditioned to have a certain response when we are exposed repeatedly to a stimuli.  In this case, when we have been told repeatedly that we can feel better with food (the stimuli), we believe that we do indeed feel better (the response) when we eat cakes and cookies and such. However, what we don’t think about is the other stimulus, the care, the concern, the love that came with the food and how that made us feel. In other words, we may have attributed our response (feeling better) to the wrong stimulus (food) rather than the one that actually did make us feel better, which was the love we felt.    So maybe it is not the food that makes us feel happy. Maybe it is the memory of these people expressing their love and care to us. Maybe that is what really makes us happy. Patients often tell me that they eat when sad, lonely or bored. They are seeking comfort.    They want to feel “full” or “satisfied” and the food does offer that physical release. However, the true comfort that they seek cannot be found in carbohydrates or fat, but it can be found in the feeling of belonging, of connecting with others, of being creative and inspired.    Conclusion  Remember, you have had a lifetime of creating a pattern of using comfort foods, so it is not likely to change quickly. Make sure to give yourself some time to make these changes. When you are able to change the relationship with food, you are able to change your relationships with others, and you just might find more satisfying and healthier relationships.

## 2024-06-20 DIAGNOSIS — Z86.39 HISTORY OF MORBID OBESITY: ICD-10-CM

## 2024-06-20 DIAGNOSIS — R63.8 CRAVING FOR PARTICULAR FOOD: ICD-10-CM

## 2024-06-20 DIAGNOSIS — E66.9 CLASS 1 OBESITY WITH SERIOUS COMORBIDITY AND BODY MASS INDEX (BMI) OF 30.0 TO 30.9 IN ADULT, UNSPECIFIED OBESITY TYPE: ICD-10-CM

## 2024-06-20 DIAGNOSIS — Z51.81 ENCOUNTER FOR THERAPEUTIC DRUG MONITORING: ICD-10-CM

## 2024-06-20 NOTE — TELEPHONE ENCOUNTER
Requesting   Requested Prescriptions     Pending Prescriptions Disp Refills    PHENTERMINE HCL 37.5 MG Oral Tab [Pharmacy Med Name: PHENTERMINE HCL TABS 37.5MG] 90 tablet 0     Sig: TAKE 1 TABLET EVERY MORNING    WEGOVY 2.4 MG/0.75ML Subcutaneous Solution Auto-injector [Pharmacy Med Name: WEGOVY PEN INJ 0.75ML 4'S 2.4MG] 9 mL 3     Sig: INJECT 0.75 ML (2.4 MG) UNDER THE SKIN WEEKLY     LOV: 4/2/24  RTC: 3 months  Filled: phen 4/2/24 #90 with 0 refills  Wegovy 11/13/23 #9 with 1 refill    No future appointments.

## 2024-06-23 RX ORDER — PHENTERMINE HYDROCHLORIDE 37.5 MG/1
37.5 TABLET ORAL EVERY MORNING
Qty: 90 TABLET | Refills: 0 | Status: SHIPPED | OUTPATIENT
Start: 2024-06-23

## 2024-06-23 RX ORDER — SEMAGLUTIDE 2.4 MG/.75ML
2.4 INJECTION, SOLUTION SUBCUTANEOUS WEEKLY
Qty: 9 ML | Refills: 1 | Status: SHIPPED | OUTPATIENT
Start: 2024-06-23

## 2024-09-08 DIAGNOSIS — Z86.39 HISTORY OF MORBID OBESITY: ICD-10-CM

## 2024-09-08 DIAGNOSIS — E66.9 CLASS 1 OBESITY WITH SERIOUS COMORBIDITY AND BODY MASS INDEX (BMI) OF 30.0 TO 30.9 IN ADULT, UNSPECIFIED OBESITY TYPE: ICD-10-CM

## 2024-09-08 DIAGNOSIS — Z51.81 ENCOUNTER FOR THERAPEUTIC DRUG MONITORING: ICD-10-CM

## 2024-09-08 DIAGNOSIS — R63.8 CRAVING FOR PARTICULAR FOOD: ICD-10-CM

## 2024-09-10 ENCOUNTER — PATIENT MESSAGE (OUTPATIENT)
Dept: INTERNAL MEDICINE CLINIC | Facility: CLINIC | Age: 64
End: 2024-09-10

## 2024-09-10 RX ORDER — TOPIRAMATE 100 MG/1
100 TABLET, FILM COATED ORAL 2 TIMES DAILY
Qty: 60 TABLET | Refills: 0 | Status: SHIPPED | OUTPATIENT
Start: 2024-09-10

## 2024-09-10 RX ORDER — PHENTERMINE HYDROCHLORIDE 37.5 MG/1
37.5 TABLET ORAL EVERY MORNING
Qty: 30 TABLET | Refills: 0 | Status: SHIPPED | OUTPATIENT
Start: 2024-09-10

## 2024-09-10 NOTE — TELEPHONE ENCOUNTER
Requesting topamax and phentermine  LOV: 4/2/24  RTC: 3 months  Last Relevant Labs: na  Filled: 6/23/24 #90 with 0 refills  phentermine  [place]  Filled: 4/2/24  #180 wit 1 refills topamax    Future Appointments   Date Time Provider Department Center   10/23/2024  2:40 PM Dayana Rock APRN [facility]     Pt made above appt in June

## 2024-10-23 ENCOUNTER — OFFICE VISIT (OUTPATIENT)
Dept: INTERNAL MEDICINE CLINIC | Facility: CLINIC | Age: 64
End: 2024-10-23
Payer: COMMERCIAL

## 2024-10-23 VITALS
WEIGHT: 183 LBS | DIASTOLIC BLOOD PRESSURE: 86 MMHG | RESPIRATION RATE: 18 BRPM | HEART RATE: 85 BPM | BODY MASS INDEX: 30.49 KG/M2 | SYSTOLIC BLOOD PRESSURE: 126 MMHG | HEIGHT: 65 IN

## 2024-10-23 DIAGNOSIS — R63.8 CRAVING FOR PARTICULAR FOOD: ICD-10-CM

## 2024-10-23 DIAGNOSIS — E66.811 CLASS 1 OBESITY WITH SERIOUS COMORBIDITY AND BODY MASS INDEX (BMI) OF 30.0 TO 30.9 IN ADULT, UNSPECIFIED OBESITY TYPE: ICD-10-CM

## 2024-10-23 DIAGNOSIS — Z51.81 ENCOUNTER FOR THERAPEUTIC DRUG MONITORING: Primary | ICD-10-CM

## 2024-10-23 DIAGNOSIS — R73.03 PREDIABETES: ICD-10-CM

## 2024-10-23 DIAGNOSIS — Z86.39 HISTORY OF MORBID OBESITY: ICD-10-CM

## 2024-10-23 PROCEDURE — 3074F SYST BP LT 130 MM HG: CPT | Performed by: NURSE PRACTITIONER

## 2024-10-23 PROCEDURE — 99213 OFFICE O/P EST LOW 20 MIN: CPT | Performed by: NURSE PRACTITIONER

## 2024-10-23 PROCEDURE — 3008F BODY MASS INDEX DOCD: CPT | Performed by: NURSE PRACTITIONER

## 2024-10-23 PROCEDURE — 3079F DIAST BP 80-89 MM HG: CPT | Performed by: NURSE PRACTITIONER

## 2024-10-23 RX ORDER — ALBUTEROL SULFATE 90 UG/1
2 INHALANT RESPIRATORY (INHALATION) EVERY 4 HOURS PRN
COMMUNITY
Start: 2024-02-21

## 2024-10-23 RX ORDER — MUPIROCIN 20 MG/G
OINTMENT TOPICAL
COMMUNITY
Start: 2024-05-09

## 2024-10-23 RX ORDER — VALACYCLOVIR HYDROCHLORIDE 1 G/1
2000 TABLET, FILM COATED ORAL 2 TIMES DAILY
COMMUNITY
Start: 2024-05-09

## 2024-10-23 RX ORDER — CEFUROXIME AXETIL 250 MG/1
TABLET ORAL
COMMUNITY
Start: 2024-06-10

## 2024-10-23 RX ORDER — BUPROPION HYDROCHLORIDE 300 MG/1
300 TABLET ORAL EVERY MORNING
Qty: 30 TABLET | Refills: 3 | Status: SHIPPED | OUTPATIENT
Start: 2024-10-23

## 2024-10-23 RX ORDER — METHYLPREDNISOLONE 4 MG/1
1 TABLET ORAL AS DIRECTED
COMMUNITY
Start: 2024-08-02

## 2024-10-23 RX ORDER — PHENTERMINE HYDROCHLORIDE 37.5 MG/1
37.5 TABLET ORAL EVERY MORNING
Qty: 90 TABLET | Refills: 0 | Status: SHIPPED | OUTPATIENT
Start: 2024-10-23

## 2024-10-23 RX ORDER — TOPIRAMATE 100 MG/1
100 TABLET, FILM COATED ORAL 2 TIMES DAILY
Qty: 180 TABLET | Refills: 1 | Status: SHIPPED | OUTPATIENT
Start: 2024-10-23

## 2024-10-23 RX ORDER — CLOBETASOL PROPIONATE 0.5 MG/ML
SOLUTION TOPICAL
COMMUNITY
Start: 2024-05-09

## 2024-10-23 NOTE — PROGRESS NOTES
Nai Colindres is a 64 year old female presents today for follow-up on medical weight loss program for the treatment of overweight, obesity, or morbid obesity with associated prediabetes, Vitamin D deficiency, WANG.    S:  Current weight   Wt Readings from Last 6 Encounters:   10/23/24 183 lb (83 kg)   11/13/23 184 lb (83.5 kg)   02/20/23 170 lb (77.1 kg)   08/04/22 174 lb (78.9 kg)   06/22/22 182 lb (82.6 kg)   11/10/21 206 lb (93.4 kg)    AND BMI Body mass index is 30.45 kg/m²..    Patient has lost 1# since LOV in 11/2023 via clinic and VV in 4/2024. She has been compliant with medication.    Testing/consult completed since LOV: Dietician: Estimated caloric needs for weight loss: 1400 cals/d for 2 pounds/week weight loss, 100 gms carb (25% of calories), 80 gms protein (20% of calories).  Labs: +prediabetes, +low ferritin = WANG.    [facility] Follow Up    Question 10/23/2024 12:52 PM CDT - Filed by Patient   Please describe a success moment: Continuing to walk with neighbors daily   Please describe a challenging moment/needs for improvement: Not losing but gaining   Please complete this 24 hour food journal, listing everything you had to eat in the past day. Include the average time of day you ate these meals at    List foods, qty and prep for breakfast: Coffee with cream and splenda   List foods, qty and prep for lunch. Small grilled cheese sandwich with vegetables   List foods, qty and prep for dinner. Chicken spaghetti with veggies   List foods, qty and prep for snacks. Veggies banana   List the types and qty of fluids consumed Water and pomegranate ice tea with splenda   On average, how many meals did you eat out per week? 1   Exercise    How many days per week are you active or exercise 5   On average, how many days were anaerobic (strength/resistance) exercises performed? 3   On average, how many days were aerobic (cardio) exercises performed? 5   Perceived level of exertion on a scale of  1-5, with 5 being very intense: 3   Stress    Average stress level on a scale of 1-10, with 10 being extremely stressed: 3   If greater than 5/1O how would you grade your coping mechanisms?    Sleep hours and integrity    How many hours of uninterrupted sleep do you get a night: 6   Do you feel rested in the morning: Yes   If no, what may have been disrupting your sleep? Snoring    Please list any goal(s) for your next visit Lose 10     Social hx and PMH reviewed. Employed part time from home.  with adult children. Watching grandchildren during the day.    REVIEW OF SYSTEMS:  GENERAL: feels well otherwise  LUNGS: denies shortness of breath with exertion  CARDIOVASCULAR: denies chest pain on exertion, denies palpitations or pedal edema  GI: denies abdominal pain.  No N/V/D/C  MUSCULOSKELETAL: no acute joint or muscle pain  NEURO: denies headaches or dizziness  PSYCH: denies change in behavior or mood, denies feeling sad or depressed    EXAM:  /86   Pulse 85   Resp 18   Ht 5' 5\" (1.651 m)   Wt 183 lb (83 kg)   BMI 30.45 kg/m²    GENERAL: well developed, well nourished, in no apparent distress, obese  EYES: conjunctiva pink, sclera non icteric, PERRLA  LUNGS: CTA in all fields, breathing non labored  CARDIO: RRR without murmur, normal S1 and S2 without clicks or gallops, no pedal edema.  GI: +BS  NEURO/MS: motor and sensory grossly intact  PSYCH: pleasant, cooperative, normal mood and affect    ASSESSMENT AND PLAN:  Encounter Diagnoses   Name Primary?    Encounter for therapeutic drug monitoring Yes    Class 1 obesity with serious comorbidity and body mass index (BMI) of 30.0 to 30.9 in adult, unspecified obesity type     Prediabetes     History of morbid obesity     Craving for particular food          No orders of the defined types were placed in this encounter.      Meds & Refills for this Visit:  Requested Prescriptions     Signed Prescriptions Disp Refills    buPROPion  MG Oral  Tablet 24 Hr 30 tablet 3     Sig: Take 1 tablet (300 mg total) by mouth every morning.    Phentermine HCl 37.5 MG Oral Tab 90 tablet 0     Sig: Take 1 tablet (37.5 mg total) by mouth every morning.    topiramate 100 MG Oral Tab 180 tablet 1     Sig: Take 1 tablet (100 mg total) by mouth 2 (two) times daily.       Imaging & Consults:  None      Plan:  Patient has lost 1# since LOV in 11/2023 on phentermine 37.5 mg daily, Topamax 100 mg BID, Wegovy 2.4 mg weekly, Wellbutrin  mg daily with a total weight loss of 76# since initial consult on 8/25/20 with initial weight of 259#. Weight loss goal: lose weight, be more fit and maintain. BP controlled. CPM aside from increase Wellbutrin XL. Consider Zepbound alternative to Wegovy if covered. Hx of Wellbutrin, naltrexone with shin pain?  on nutrition. See patient instructions below for additional plans and patient counseling.      Patient Instructions   Continue making lifestyle changes that focus on good nutrition, regular exercise and stress management.    Medication Plan: Continue current medication regimen aside from increase Wellbutrin XL to 300 mg daily. Look into coverage for Zepbound as alternative to Wegovy if cost affordable.    Next steps to work on before next office visit include:     I also recommend modifying nutrition with a focus on Food 4 Fuel and eating clean, lean and green!     Eat whole foods (think farm to table sources) and minimize/eliminate processed and ultra processed foods.  Eat lean sources of protein, recommending incorporating plant based options (no fat/cholesterol in plants) and focus on lean animal protein sources such as eggs, chicken and fish. Minimize beef such as pork and red meat to 1 serving/week.  Increase the consumption of plant based foods with a goal of making 85% of your daily nutrition from plants. Plant based foods are less calorically dense and more nutritionally dense. They do not have fat, which can contribute  to insulin resistance and elevated cholesterol. Fruits and vegetables provide an array of vitamins, minerals, phytonutrients (plant protectors) and antiinflammatory properties. All these components help to prevent disease and help your body to work properly!      Re-thinking Nutrition: Learning to See Food as Fuel  October 8, 2019  Posted in Blog, Nutrition  By [employer] [facility]    “Dieting” can start to feel challenging when we begin to associate healthy behaviors with “punishment.” Too often, we overlook the real reason we eat food. It's not only meant to be enjoyed, but also to provide basic fuel for our bodies.  Learning to see food as fuel can help you find balance in your journey with weight. It will teach you about the primal purpose of food, the effect certain foods have on health, and how to listen carefully to what your body is trying to tell you.  It Starts with Cells  Did you know your body has more than 35 trillion cells? Each one serves a purpose.  Once a cell has completed its purpose, it dies. Your body replaces dead and worn-out cells with new and energetic ones. It also depends on this ongoing cell cycle to keep you healthy. And guess what? The foods you eat help shape this process.  Seeing Food as Fuel  Eating the right foods helps build and repair cells to be stronger than before. It does this by getting nutrition from whole grains, vitamins, minerals, fats, protein and other nutrients.  These essential nutrients matter greatly to every single cell in your body. They make up your:  Cell membrane  Nucleus  Mitochondria  When cells join together, they make tissue that brings life to your bones, brain, skin, nerves, muscles, etc. The health and lifespan of your cells depend on the nutrients you get from food. That is why healthy food choices are so important.   Once you can start to see food as being fuel for your body, you will get better at making the healthy choice the easy choice.  Food will be less about rewards or punishments and more about supporting your overall health and happiness.  Building Blocks of Good Nutrition  A diet without enough fiber, protein and healthy fats can cause your cells to become brittle, leaky and tired. When cells can't do what they are designed to do, problems like inflammation, cancer and other difficult health conditions start to arise.  Foods that Support Healthy Cells:  Unsaturated Fats - Fish, nuts avocados, olive oil, flax seed, etc.  Protein - Poultry, lean beef, yogurt, eggs, seeds, beans, etc.  Antioxidants - Fruits, dark green veggies, sweet potatoes, tea, etc.  So, the next time you grab something to eat, ask yourself how that food helps or hurts your body. This is a part of living mindful and being knowledgeable about what you consume regularly.  When you start to see food as fuel, not just a tasty treat or the solution to a bad temper, you will start to make healthier choices more often. Making new habits is one of the building blocks to successful long-term weight management!  Clean Eating: What is it Really About?    March 18, 2022  Posted in Blog, Nutrition  [employer] [facility]    Many people focus on “clean eating.” In a world filled with potato chips, fast food burgers and cake, switching to clean eating can be a big change. Is it time for you to make it?    What is Clean Eating?  Eating clean has many variations and has gained popularity over the last several years. It focuses on choosing whole foods and minimally-processed foods and is more of a philosophy than a diet. The goal is to choose foods as close to their natural state as possible. Adding fruits, vegetables, meats, and whole grains is a great start. Processed foods such as chips, cookies and fats are eliminated.    Unlike other plans, clean eating isn’t specifically about portion sizes or numbers. Some find this way of eating to be easier. There is limited research on  eating clean; however, a clean diet is plant-based and low in saturated fat.    Tips for Eating Clean    Limit packaged processed foods. Some of this is obvious. It’s time to trade in the chips, cookies and snack cakes for other foods such as fruits, vegetables and nuts. Additionally, you might need to change how you prepare food. Swap boxed mashed potatoes for whole baked potatoes. Instead of bagged salad, chop your own salad from fresh vegetables.    Read the labels. With the focus on minimally-processed foods, the fewer the ingredients the better. Avoid added sugar, sweeteners and preservatives.  Find other ways to spice up your food. Fresh herbs can go further than any added preservative. Basil, cilantro or chives can spice up any meal.  Choose whole grains. White bread and refined grains should be limited. Instead, choose whole-grain bread, quinoa, brown rice and oats.    Hydrate with water. Water is your main source of fluid. For some variety and extra flavor, add a slice of lime or cucumber into your water. Sodas and sugary drinks should be eliminated. Herbal teas are a great option.  Dive into dairy. Milk, unsweetened yogurt and cheese can be great choices. Try to avoid sweetened milks or yogurts.  Pack the protein. Protein from beans, nuts and legumes are great. So are lean meats without fatty flavorings. Some clean eaters avoid meat from certain grocery stores or brands due to growth hormones and antibiotics. For those, choosing organic may be an option.    Take Things Slow  It can be a little overwhelming to begin a clean eating plan. Throwing away the contents of your cabinet may not be an option. Instead of taking away, focus on adding. Add more fruit and more vegetables to your day. You will find that by doing this, there is less room for processed foods. Small changes over time can add up. Get started today!      What are proteins?  Proteins are one of three primary macronutrients that provide energy  to the human body, along with fats and carbohydrates. Proteins are also responsible for a large portion of the work that is done in cells; they are necessary for proper structure and function of tissues and organs, and also act to regulate them. They are comprised of a number of amino acids that are essential to proper body function, and serve as the building blocks of body tissue.  There are 20 different amino acids in total, and the sequence of amino acids determines a protein's structure and function. While some amino acids can be synthesized in the body, there are 9 amino acids that humans can only obtain from dietary sources (insufficient amounts of which may sometimes result in death), termed essential amino acids. Foods that provide all of the essential amino acids are called complete protein sources, and include both animal (meat, dairy, eggs, fish) as well as plant-based sources (soy, quinoa, buckwheat).  Proteins can be categorized based on the function they provide to the body. Below is a list of some types of proteins:  Antibody--proteins that protect the body from foreign particles, such as viruses and bacteria, by binding to them  Enzyme--proteins that help form new molecules as well as perform the many chemical reactions that occur throughout the body  Messenger--proteins that transmit signals throughout the body to maintain body processes  Structural component--proteins that act as building blocks for cells that ultimately allow the body to move  Transport/storage--proteins that move molecules throughout the body  As can be seen, proteins have many important roles throughout the body, and as such, it is important to provide sufficient nutrition to the body to maintain healthy protein levels.    How much protein do I need?  The amount of protein that the human body requires daily is dependent on many conditions, including overall energy intake, growth of the individual, and physical activity level. It  is often estimated based on body weight, as a percentage of total caloric intake (10-35%), or based on age alone. 0.8g/kg of body weight is a commonly cited recommended dietary allowance (RDA). This value is the minimum recommended value to maintain basic nutritional requirements, but consuming more protein, up to a certain point, maybe beneficial, depending on the sources of the protein.  The recommended range of protein intake is between 0.8 g/kg and 1.8 g/kg of body weight, dependent on the many factors listed above. People who are highly active, or who wish to build more muscle should generally consume more protein. Some sources suggest consuming between 1.8 to 2 g/kg for those who are highly active. The amount of protein a person should consume, to date, is not an exact science, and each individual should consult a specialist, be it a dietitian, doctor, or , to help determine their individual needs. . I recommend consuming a minimum of 100 grams of protein daily.    Foods high in protein  There are many different combinations of food that a person can eat to meet their protein intake requirements. For many people, a large portion of protein intake comes from meat and dairy, though it is possible to get enough protein while meeting certain dietary restrictions you might have. Generally, it is easier to meet your RDA of protein by consuming meat and dairy, but an excess of either can have a negative health impact. There are plenty of plant-based protein options, but they generally contain less protein in a given serving. Ideally, a person should consume a mixture of meat, dairy, and plant-based foods in order to meet their RDA and have a balanced diet replete with nutrients.  If possible, consuming a variety of complete proteins is recommended. A complete protein is a protein that contains a good amount of each of the nine essential amino acids required in the human diet. Examples of complete  protein foods or meals include:    Meat/Dairy examples  Eggs  Chicken breast  Cottage cheese  Greek yogurt  Milk  Lean beef  Tuna  Turkey breast  Fish  Shrimp    Vegan/plant-based examples  Buckwheat  Hummus and yolanda  Soy products (tofu, tempeh, edamame beans)  Peanut butter on toast or some other bread  Beans and rice  Quinoa  Hemp and sunday seeds  Spirulina  Generally, meat, poultry, fish, eggs, and dairy products are complete protein sources. Nuts and seeds, legumes, grains, and vegetables, among other things, are usually incomplete proteins. There is nothing wrong with incomplete proteins however, and there are many healthy, high protein foods that are incomplete proteins. As long as you consume a sufficient variety of incomplete proteins to get all the required amino acids, it is not necessary to specifically eat complete protein foods. In fact, certain high fat red meats for example, a common source of complete proteins, can be unhealthy.   Below are some examples of high protein foods that are not complete proteins:  Almonds  Oats  Broccoli  Lentils  Cam bread  Sunday seeds  Pumpkin seeds  Peanuts  [place] sprouts  Grapefruit  Green peas  Avocados  Mushrooms  As can be seen, there are many different foods a person can consume to meet their RDA of protein. The examples provided above do not constitute an exhaustive list of high protein or complete protein foods. As with everything else, balance is important, and the examples provided above are an attempt at providing a list of healthier protein options (when consumed in moderation).    Amount of protein in common food      Protein Amount  Milk (1 cup/8 oz)  8 g  Egg (1 large/50 g)  6 g  Meat (1 slice / 2 oz)  14 g  Seafood (2 oz)  16 g  Bread (1 slice/64 g)   8 g  Corn (1 cup/166 g)  16 g  Rice (1 cup/195 g)  5 g  Dry Bean (1 cup/92 g)   16 g  Nuts (1 cup/92 g)    20 g  Fruits and Veggie (1 cup)  1 g    Data above taken from www.calculator.net    The Power  of Protein:    High Protein Foods:  FISH  (3-6 ounces/meal)  All types of fish  Seafood (shrimp, scallops, clams, mussels, lobster)  EGGS     2-3 eggs/meal  DAIRY (2/3 to 1 ½ cup)  Cottage cheese   Greek yogurt  PLANTS (½-3/4 cup/meal)  Legumes: Dried beans and peas (black beans, roberto beans, garbanzo beans, kidney, cannellini, navy, split peas, black eyed peas)  Lentils  Quinoa  Soy (edamame, tofu)  PORK   (3-6 oz/meal)  Tenderloin  Pork chop  Top loin roast, boneless  Sirloin roast, boneless  Ringling meyer (nitrate free)  Boiled deli ham (nitrate free)    Additional Protein Sources:    BEEF    (3-6 oz/meal)  Flank steak       Skirt steak  Bottom round(rump roast), select   Ground beef, 90% lean (ground sirloin)  Luis eye steak, choice  Eye of round roast, choice   POULTRY  (3-6 oz/meal)  Ground chicken or turkey  Chicken, no skin  Turkey, no skin  PROTEIN SHAKES: ADDIS and Betty (plant based and dairy free), Corepower by [employer]bek [employer], Lizbet (plant based option available), Premier Protein, JuicePlus Complete (www.juiceplus.com)  PROTEIN BARS: RXBAR, PowerCrunch, Quest, Barebells, Mosh      Nutrition and MyPlate: Vegetables  Vegetables are a major source of fiber. They’re also packed with vitamins needed for health and growth. At mealtimes, make half your plate fruits and vegetables.  Nutrient-rich choices  Fresh, frozen, or canned--all vegetables are high in nutrients. The color of the skin tells you what’s inside. So if you eat plenty of colors, you get a variety of nutrients. Some good choices include:  Dark green vegetables, such as spinach, jh greens, kale, and broccoli.  Bright red and orange vegetables, such as carrots, sweet potatoes, red bell peppers, and tomatoes.  Starchy vegetables, such as potatoes and squash.  What makes vegetables less healthy?  Boiling vegetables causes some vitamins to escape into the water. To hold on to vitamins, briefly steam, sauté, stir-wong, or microwave instead. Overcooking  destroys vitamins, so try to keep vegetables a little crispy.  Using a lot of margarine, butter, or salad dressing adds fat and calories, but not many nutrients. A small amount of these toppings is OK. But the more you add, the more fat you add, too.  Frozen vegetables that come with cheese sauce or other processed flavoring are high in fat and salt. It's healthier to season plain frozen vegetables yourself. Try fresh herbs, garlic, toasted almonds, or sesame seeds.  Canned vegetables often have lots of salt. Shop for low-sodium varieties.  One small change  Sneak vegetables into every meal. Shred carrots into hamburger, or add zucchini to spaghetti and meatballs. You won't even notice! Have a better idea? Write it here:  ________________________________________________________  Date Last Reviewed: 10/1/2017  © 8785-5051 [employer]. [address] [place] [address]. All rights reserved. This information is not intended as a substitute for professional medical care. Always follow your healthcare professional's instructions.      Medication use and SEs reviewed with patient.    Return in about 4 months (around 2/23/2025) for weight management via clinic.    Patient verbalizes understanding.    DOCUMENTATION OF TIME SPENT: Code selection for this visit was based on time spent : 20 minutes on date of service in preparing to see the patient, obtaining and/or reviewing separately obtained history, performing a medically appropriate examination, counseling and educating the patient/family/caregiver, ordering medications or testing, referring and communicating with other healthcare providers, documenting clinical information in the electronic medical record, independently interpreting results and communicating results to the patient/family/caregiver and care coordination with the patient's other providers.

## 2024-10-23 NOTE — PATIENT INSTRUCTIONS
Continue making lifestyle changes that focus on good nutrition, regular exercise and stress management.    Medication Plan: Continue current medication regimen aside from increase Wellbutrin XL to 300 mg daily. Look into coverage for Zepbound as alternative to Wegovy if cost affordable.    Next steps to work on before next office visit include:     I also recommend modifying nutrition with a focus on Food 4 Fuel and eating clean, lean and green!     Eat whole foods (think farm to table sources) and minimize/eliminate processed and ultra processed foods.  Eat lean sources of protein, recommending incorporating plant based options (no fat/cholesterol in plants) and focus on lean animal protein sources such as eggs, chicken and fish. Minimize beef such as pork and red meat to 1 serving/week.  Increase the consumption of plant based foods with a goal of making 85% of your daily nutrition from plants. Plant based foods are less calorically dense and more nutritionally dense. They do not have fat, which can contribute to insulin resistance and elevated cholesterol. Fruits and vegetables provide an array of vitamins, minerals, phytonutrients (plant protectors) and antiinflammatory properties. All these components help to prevent disease and help your body to work properly!      Re-thinking Nutrition: Learning to See Food as Fuel  October 8, 2019  Posted in Blog, Nutrition  [employer] [facility]    “Dieting” can start to feel challenging when we begin to associate healthy behaviors with “punishment.” Too often, we overlook the real reason we eat food. It's not only meant to be enjoyed, but also to provide basic fuel for our bodies.  Learning to see food as fuel can help you find balance in your journey with weight. It will teach you about the primal purpose of food, the effect certain foods have on health, and how to listen carefully to what your body is trying to tell you.  It Starts with Cells  Did you know your  body has more than 35 trillion cells? Each one serves a purpose.  Once a cell has completed its purpose, it dies. Your body replaces dead and worn-out cells with new and energetic ones. It also depends on this ongoing cell cycle to keep you healthy. And guess what? The foods you eat help shape this process.  Seeing Food as Fuel  Eating the right foods helps build and repair cells to be stronger than before. It does this by getting nutrition from whole grains, vitamins, minerals, fats, protein and other nutrients.  These essential nutrients matter greatly to every single cell in your body. They make up your:  Cell membrane  Nucleus  Mitochondria  When cells join together, they make tissue that brings life to your bones, brain, skin, nerves, muscles, etc. The health and lifespan of your cells depend on the nutrients you get from food. That is why healthy food choices are so important.   Once you can start to see food as being fuel for your body, you will get better at making the healthy choice the easy choice. Food will be less about rewards or punishments and more about supporting your overall health and happiness.  Building Blocks of Good Nutrition  A diet without enough fiber, protein and healthy fats can cause your cells to become brittle, leaky and tired. When cells can't do what they are designed to do, problems like inflammation, cancer and other difficult health conditions start to arise.  Foods that Support Healthy Cells:  Unsaturated Fats - Fish, nuts avocados, olive oil, flax seed, etc.  Protein - Poultry, lean beef, yogurt, eggs, seeds, beans, etc.  Antioxidants - Fruits, dark green veggies, sweet potatoes, tea, etc.  So, the next time you grab something to eat, ask yourself how that food helps or hurts your body. This is a part of living mindful and being knowledgeable about what you consume regularly.  When you start to see food as fuel, not just a tasty treat or the solution to a bad temper, you will  start to make healthier choices more often. Making new habits is one of the building blocks to successful long-term weight management!  Clean Eating: What is it Really About?    March 18, 2022  Posted in Blog, Nutrition  [employer] [facility]    Many people focus on “clean eating.” In a world filled with potato chips, fast food burgers and cake, switching to clean eating can be a big change. Is it time for you to make it?    What is Clean Eating?  Eating clean has many variations and has gained popularity over the last several years. It focuses on choosing whole foods and minimally-processed foods and is more of a philosophy than a diet. The goal is to choose foods as close to their natural state as possible. Adding fruits, vegetables, meats, and whole grains is a great start. Processed foods such as chips, cookies and fats are eliminated.    Unlike other plans, clean eating isn’t specifically about portion sizes or numbers. Some find this way of eating to be easier. There is limited research on eating clean; however, a clean diet is plant-based and low in saturated fat.    Tips for Eating Clean    Limit packaged processed foods. Some of this is obvious. It’s time to trade in the chips, cookies and snack cakes for other foods such as fruits, vegetables and nuts. Additionally, you might need to change how you prepare food. Swap boxed mashed potatoes for whole baked potatoes. Instead of bagged salad, chop your own salad from fresh vegetables.    Read the labels. With the focus on minimally-processed foods, the fewer the ingredients the better. Avoid added sugar, sweeteners and preservatives.  Find other ways to spice up your food. Fresh herbs can go further than any added preservative. Basil, cilantro or chives can spice up any meal.  Choose whole grains. White bread and refined grains should be limited. Instead, choose whole-grain bread, quinoa, brown rice and oats.    Hydrate with water. Water is your main  source of fluid. For some variety and extra flavor, add a slice of lime or cucumber into your water. Sodas and sugary drinks should be eliminated. Herbal teas are a great option.  Dive into dairy. Milk, unsweetened yogurt and cheese can be great choices. Try to avoid sweetened milks or yogurts.  Pack the protein. Protein from beans, nuts and legumes are great. So are lean meats without fatty flavorings. Some clean eaters avoid meat from certain grocery stores or brands due to growth hormones and antibiotics. For those, choosing organic may be an option.    Take Things Slow  It can be a little overwhelming to begin a clean eating plan. Throwing away the contents of your cabinet may not be an option. Instead of taking away, focus on adding. Add more fruit and more vegetables to your day. You will find that by doing this, there is less room for processed foods. Small changes over time can add up. Get started today!      What are proteins?  Proteins are one of three primary macronutrients that provide energy to the human body, along with fats and carbohydrates. Proteins are also responsible for a large portion of the work that is done in cells; they are necessary for proper structure and function of tissues and organs, and also act to regulate them. They are comprised of a number of amino acids that are essential to proper body function, and serve as the building blocks of body tissue.  There are 20 different amino acids in total, and the sequence of amino acids determines a protein's structure and function. While some amino acids can be synthesized in the body, there are 9 amino acids that humans can only obtain from dietary sources (insufficient amounts of which may sometimes result in death), termed essential amino acids. Foods that provide all of the essential amino acids are called complete protein sources, and include both animal (meat, dairy, eggs, fish) as well as plant-based sources (soy, quinoa,  buckwheat).  Proteins can be categorized based on the function they provide to the body. Below is a list of some types of proteins:  Antibody--proteins that protect the body from foreign particles, such as viruses and bacteria, by binding to them  Enzyme--proteins that help form new molecules as well as perform the many chemical reactions that occur throughout the body  Messenger--proteins that transmit signals throughout the body to maintain body processes  Structural component--proteins that act as building blocks for cells that ultimately allow the body to move  Transport/storage--proteins that move molecules throughout the body  As can be seen, proteins have many important roles throughout the body, and as such, it is important to provide sufficient nutrition to the body to maintain healthy protein levels.    How much protein do I need?  The amount of protein that the human body requires daily is dependent on many conditions, including overall energy intake, growth of the individual, and physical activity level. It is often estimated based on body weight, as a percentage of total caloric intake (10-35%), or based on age alone. 0.8g/kg of body weight is a commonly cited recommended dietary allowance (RDA). This value is the minimum recommended value to maintain basic nutritional requirements, but consuming more protein, up to a certain point, maybe beneficial, depending on the sources of the protein.  The recommended range of protein intake is between 0.8 g/kg and 1.8 g/kg of body weight, dependent on the many factors listed above. People who are highly active, or who wish to build more muscle should generally consume more protein. Some sources suggest consuming between 1.8 to 2 g/kg for those who are highly active. The amount of protein a person should consume, to date, is not an exact science, and each individual should consult a specialist, be it a dietitian, doctor, or , to help determine  their individual needs. . I recommend consuming a minimum of 100 grams of protein daily.    Foods high in protein  There are many different combinations of food that a person can eat to meet their protein intake requirements. For many people, a large portion of protein intake comes from meat and dairy, though it is possible to get enough protein while meeting certain dietary restrictions you might have. Generally, it is easier to meet your RDA of protein by consuming meat and dairy, but an excess of either can have a negative health impact. There are plenty of plant-based protein options, but they generally contain less protein in a given serving. Ideally, a person should consume a mixture of meat, dairy, and plant-based foods in order to meet their RDA and have a balanced diet replete with nutrients.  If possible, consuming a variety of complete proteins is recommended. A complete protein is a protein that contains a good amount of each of the nine essential amino acids required in the human diet. Examples of complete protein foods or meals include:    Meat/Dairy examples  Eggs  Chicken breast  Cottage cheese  Greek yogurt  Milk  Lean beef  Tuna  Turkey breast  Fish  Shrimp    Vegan/plant-based examples  Buckwheat  Hummus and yolanda  Soy products (tofu, tempeh, edamame beans)  Peanut butter on toast or some other bread  Beans and rice  Quinoa  Hemp and raf seeds  Spirulina  Generally, meat, poultry, fish, eggs, and dairy products are complete protein sources. Nuts and seeds, legumes, grains, and vegetables, among other things, are usually incomplete proteins. There is nothing wrong with incomplete proteins however, and there are many healthy, high protein foods that are incomplete proteins. As long as you consume a sufficient variety of incomplete proteins to get all the required amino acids, it is not necessary to specifically eat complete protein foods. In fact, certain high fat red meats for example, a common  source of complete proteins, can be unhealthy.   Below are some examples of high protein foods that are not complete proteins:  Almonds  Oats  Broccoli  Lentils  Cam bread  Sunday seeds  Pumpkin seeds  Peanuts  [place] sprouts  Grapefruit  Green peas  Avocados  Mushrooms  As can be seen, there are many different foods a person can consume to meet their RDA of protein. The examples provided above do not constitute an exhaustive list of high protein or complete protein foods. As with everything else, balance is important, and the examples provided above are an attempt at providing a list of healthier protein options (when consumed in moderation).    Amount of protein in common food      Protein Amount  Milk (1 cup/8 oz)  8 g  Egg (1 large/50 g)  6 g  Meat (1 slice / 2 oz)  14 g  Seafood (2 oz)  16 g  Bread (1 slice/64 g)   8 g  Corn (1 cup/166 g)  16 g  Rice (1 cup/195 g)  5 g  Dry Bean (1 cup/92 g)   16 g  Nuts (1 cup/92 g)    20 g  Fruits and Veggie (1 cup)  1 g    Data above taken from www.calculator.net    The Power of Protein:    High Protein Foods:  FISH  (3-6 ounces/meal)  All types of fish  Seafood (shrimp, scallops, clams, mussels, lobster)  EGGS     2-3 eggs/meal  DAIRY (2/3 to 1 ½ cup)  Cottage cheese   Greek yogurt  PLANTS (½-3/4 cup/meal)  Legumes: Dried beans and peas (black beans, roberto beans, garbanzo beans, kidney, cannellini, navy, split peas, black eyed peas)  Lentils  Quinoa  Soy (edamame, tofu)  PORK   (3-6 oz/meal)  Tenderloin  Pork chop  Top loin roast, boneless  Sirloin roast, boneless  [place] meyer (nitrate free)  Boiled deli ham (nitrate free)    Additional Protein Sources:    BEEF    (3-6 oz/meal)  Flank steak       Skirt steak  Bottom round(rump roast), select   Ground beef, 90% lean (ground sirloin)  Luis eye steak, choice  Eye of round roast, choice   POULTRY  (3-6 oz/meal)  Ground chicken or turkey  Chicken, no skin  Turkey, no skin  PROTEIN SHAKES: ADDIS and Betty (plant based and  dairy free), Corepower by [employer]Grid, Lizbet (plant based option available), Radier Protein, JuicePlus Complete (www.juiceplus.com)  PROTEIN BARS: RXBAR, PowerCrunch, Quest, Barebells, Mosh      Nutrition and MyPlate: Vegetables  Vegetables are a major source of fiber. They’re also packed with vitamins needed for health and growth. At mealtimes, make half your plate fruits and vegetables.  Nutrient-rich choices  Fresh, frozen, or canned--all vegetables are high in nutrients. The color of the skin tells you what’s inside. So if you eat plenty of colors, you get a variety of nutrients. Some good choices include:  Dark green vegetables, such as spinach, jh greens, kale, and broccoli.  Bright red and orange vegetables, such as carrots, sweet potatoes, red bell peppers, and tomatoes.  Starchy vegetables, such as potatoes and squash.  What makes vegetables less healthy?  Boiling vegetables causes some vitamins to escape into the water. To hold on to vitamins, briefly steam, sauté, stir-wong, or microwave instead. Overcooking destroys vitamins, so try to keep vegetables a little crispy.  Using a lot of margarine, butter, or salad dressing adds fat and calories, but not many nutrients. A small amount of these toppings is OK. But the more you add, the more fat you add, too.  Frozen vegetables that come with cheese sauce or other processed flavoring are high in fat and salt. It's healthier to season plain frozen vegetables yourself. Try fresh herbs, garlic, toasted almonds, or sesame seeds.  Canned vegetables often have lots of salt. Shop for low-sodium varieties.  One small change  Sneak vegetables into every meal. Shred carrots into hamburger, or add zucchini to spaghetti and meatballs. You won't even notice! Have a better idea? Write it here:  ________________________________________________________  Date Last Reviewed: 10/1/2017  © 4058-6777 [employer]. [address] [place] [address]. All  rights reserved. This information is not intended as a substitute for professional medical care. Always follow your healthcare professional's instructions.

## 2024-12-23 DIAGNOSIS — Z51.81 ENCOUNTER FOR THERAPEUTIC DRUG MONITORING: ICD-10-CM

## 2024-12-23 DIAGNOSIS — E66.811 CLASS 1 OBESITY WITH SERIOUS COMORBIDITY AND BODY MASS INDEX (BMI) OF 30.0 TO 30.9 IN ADULT, UNSPECIFIED OBESITY TYPE: ICD-10-CM

## 2024-12-23 DIAGNOSIS — Z86.39 HISTORY OF MORBID OBESITY: ICD-10-CM

## 2024-12-23 DIAGNOSIS — R63.8 CRAVING FOR PARTICULAR FOOD: ICD-10-CM

## 2024-12-23 RX ORDER — SEMAGLUTIDE 2.4 MG/.75ML
INJECTION, SOLUTION SUBCUTANEOUS
Qty: 9 ML | Refills: 3 | OUTPATIENT
Start: 2024-12-23

## 2024-12-23 RX ORDER — PHENTERMINE HYDROCHLORIDE 37.5 MG/1
37.5 TABLET ORAL EVERY MORNING
Qty: 90 TABLET | Refills: 0 | Status: CANCELLED | OUTPATIENT
Start: 2024-12-23

## 2024-12-26 RX ORDER — SEMAGLUTIDE 2.4 MG/.75ML
2.4 INJECTION, SOLUTION SUBCUTANEOUS WEEKLY
Qty: 9 ML | Refills: 1 | Status: SHIPPED | OUTPATIENT
Start: 2024-12-26

## 2024-12-26 NOTE — TELEPHONE ENCOUNTER
Requesting   Requested Prescriptions     Pending Prescriptions Disp Refills    semaglutide-weight management (WEGOVY) 2.4 MG/0.75ML Subcutaneous Solution Auto-injector 9 mL 1     Sig: Inject 0.75 mL (2.4 mg total) into the skin once a week.      LOV: 10/23/2024  RTC: 02/23/25  Filled: 06/23/24 #9ml with 1 refills    Future Appointments   Date Time Provider Department Center   3/17/2025  3:40 PM Dayana Rock APRN [id number]

## 2025-01-12 DIAGNOSIS — E66.811 CLASS 1 OBESITY WITH SERIOUS COMORBIDITY AND BODY MASS INDEX (BMI) OF 30.0 TO 30.9 IN ADULT, UNSPECIFIED OBESITY TYPE: ICD-10-CM

## 2025-01-12 DIAGNOSIS — Z86.39 HISTORY OF MORBID OBESITY: ICD-10-CM

## 2025-01-12 DIAGNOSIS — Z51.81 ENCOUNTER FOR THERAPEUTIC DRUG MONITORING: ICD-10-CM

## 2025-01-13 NOTE — TELEPHONE ENCOUNTER
Requesting phentermine  LOV: 10/23/24  RTC: 4 months  Filled: 10/23/24 #90 with 0 refills    Future Appointments   Date Time Provider Department Center   3/17/2025  3:40 PM Dayana Rock APRN [id number]

## 2025-01-16 RX ORDER — PHENTERMINE HYDROCHLORIDE 37.5 MG/1
37.5 TABLET ORAL EVERY MORNING
Qty: 90 TABLET | Refills: 0 | Status: SHIPPED | OUTPATIENT
Start: 2025-01-16

## 2025-04-26 NOTE — PROGRESS NOTES
Nai Colindres is a 64 year old female presents today for follow-up on medical weight loss program for the treatment of overweight, obesity, or morbid obesity with associated prediabetes, WANG.    S:  Current weight   Wt Readings from Last 6 Encounters:   04/28/25 191 lb (86.6 kg)   10/23/24 183 lb (83 kg)   11/13/23 184 lb (83.5 kg)   02/20/23 170 lb (77.1 kg)   08/04/22 174 lb (78.9 kg)   06/22/22 182 lb (82.6 kg)    AND BMI Body mass index is 31.78 kg/m²..    Patient has lost -8# since LOV in 10/2024. She has been compliant with medication. She reports limited time for exercise d/t babysitting her grandchildren. Planning to start back at the gym. Reports insurance now covers Zepbound. She had thrown out her back, but not getting better.    Testing/consult completed since LOV: Dietician: Estimated caloric needs for weight loss: 1400 cals/d for 2 pounds/week weight loss, 100 gms carb (25% of calories), 80 gms protein (20% of calories).     Labs: +prediabetes, +low ferritin = WANG.    Social hx and PMH reviewed. Employed part time from home.  with adult children. Watching grandchildren during the day.    [facility] Follow Up    Question 4/25/2025 11:05 PM CDT - Filed by Patient   Please describe a success moment: Continue walking daily   Please describe a challenging moment/needs for improvement: Weight gain   Please complete this 24 hour food journal, listing everything you had to eat in the past day. Include the average time of day you ate these meals at    List foods, qty and prep for breakfast: Coffee cream Splenda cereal milk   List foods, qty and prep for lunch. Daisy ham and cheese with mustard carrots   List foods, qty and prep for dinner. Chicken green beans potato   List foods, qty and prep for snacks. Cantaloupe   List the types and qty of fluids consumed Water with flavor   On average, how many meals did you eat out per week? 1   Exercise    How many days per week are you active  or exercise 6   On average, how many days were anaerobic (strength/resistance) exercises performed? 3   On average, how many days were aerobic (cardio) exercises performed? 6   Perceived level of exertion on a scale of 1-5, with 5 being very intense: 3   Stress    Average stress level on a scale of 1-10, with 10 being extremely stressed: 5   If greater than 5/1O how would you grade your coping mechanisms? moderate   Sleep hours and integrity    How many hours of uninterrupted sleep do you get a night: 6   Do you feel rested in the morning: Yes   If no, what may have been disrupting your sleep?    Please list any goal(s) for your next visit Lose 10       REVIEW OF SYSTEMS:  GENERAL: feels well otherwise  LUNGS: denies shortness of breath with exertion  CARDIOVASCULAR: denies chest pain on exertion, denies palpitations or pedal edema  GI: denies abdominal pain.  No N/V/D/C  MUSCULOSKELETAL: see above  NEURO: denies headaches or dizziness  PSYCH: denies change in behavior or mood, denies feeling sad or depressed    EXAM:  /78   Pulse 94   Resp 16   Ht 5' 5\" (1.651 m)   Wt 191 lb (86.6 kg)   BMI 31.78 kg/m²    GENERAL: well developed, well nourished, in no apparent distress, obese  EYES: conjunctiva pink, sclera non icteric, PERRLA  LUNGS: CTA in all fields, breathing non labored  CARDIO: RRR without murmur, normal S1 and S2 without clicks or gallops, no pedal edema.  GI: +BS  NEURO/MS: motor and sensory grossly intact  PSYCH: pleasant, cooperative, normal mood and affect    ASSESSMENT AND PLAN:  Encounter Diagnoses   Name Primary?    Encounter for therapeutic drug monitoring Yes    Class 1 obesity with serious comorbidity and body mass index (BMI) of 30.0 to 30.9 in adult, unspecified obesity type     Craving for particular food     History of morbid obesity     Vitamin D deficiency     History of prediabetes            Orders Placed This Encounter   Procedures    Lipid Panel    Hemoglobin A1C    Comp  Metabolic Panel (14)    CBC With Differential With Platelet    TSH and Free T4    Vitamin B12    Vitamin D       Meds & Refills for this Visit:  Requested Prescriptions     Signed Prescriptions Disp Refills    buPROPion  MG Oral Tablet 24 Hr 90 tablet 1     Sig: Take 1 tablet (300 mg total) by mouth every morning.    Phentermine HCl 37.5 MG Oral Tab 90 tablet 0     Sig: Take 1 tablet (37.5 mg total) by mouth every morning.    topiramate 100 MG Oral Tab 180 tablet 1     Sig: Take 1 tablet (100 mg total) by mouth 2 (two) times daily.    Tirzepatide-Weight Management (ZEPBOUND) 12.5 MG/0.5ML Subcutaneous Solution Auto-injector 2 mL 0     Sig: Inject 12.5 mg into the skin once a week.    Tirzepatide-Weight Management (ZEPBOUND) 15 MG/0.5ML Subcutaneous Solution Auto-injector 2 mL 3     Sig: Inject 15 mg into the skin once a week. Begin after completing full 4 weeks on Zepbound 12.5 mg weekly dose.       Imaging & Consults:  None      Plan:  Patient has lost -8# since LOV in 10/2024 on phentermine 37.5 mg daily, Topamax 100 mg BID, Wegovy 2.4 mg weekly, Wellbutrin  mg daily with a total weight loss of 68# since initial consult on 8/25/20 with initial weight of 259#. Weight loss goal: lose weight, be more fit and maintain. BP controlled. CPM, aside from stop Wegovy and switch to Zepbound as directed. Hx of naltrexone with shin pain?  on fitness recommendations and refer for labs. See patient instructions below for additional plans and patient counseling.      Patient Instructions   Continue making lifestyle changes that focus on good nutrition, regular exercise and stress management.    Medication Plan: Continue current medication regimen aside from finish up Wegovy 2.4 mg pens and then 7 days later start Zepbound. If tolerating therapy well with successful weight loss can reduce Phentermine to 1/2 tab daily.    Start Zepbound at 12.5 mg weekly. After 4 weeks increase to the next dose of 15 mg weekly.  Visit the website [email or web address] and click on [employer] for additional details, savings, and further dosing instructions. This medication may require a prior authorization (PA) by your insurance. A PA may take one week plus to complete and our office will be in touch during this process if needed.    Tips while taking an injectable medication:    Be an intuitive eater. Listen to your hunger and fullness signals, stopping when you are full.  Consume protein and produce in your day, striving for a rainbow of color of produce.  Reduce portions to starting size of 1 cup and check in with your gut to see if you are full. Use a sand timer to slow down your eating pace to allow for 15-20 minutes to complete a meal and use the \"2 bite rule\".  Reduce refined sugars and high fat foods, as they may contribute to greater side effects of nausea and heartburn.  Stop eating 3 hours before bedtime to allow your food to digest.  Remain hydrated with water or non caloric and non caffeine beverages.  Use over the counter subhash lozenge/supplement to help reduce nausea if needed.  If you have been off your medication for more than 2 weeks please notify our office to determine next dosing, as a return to previous dose may not be appropriate or tolerated.    Next steps to work on before next office visit include: Recommend at minimum weekly yoga or pilates to support core strength and weight loss. I have ordered fasting labs to be completed at any [facility] facility. If you have had labs in the past 1 year by your PCP please upload to [facility] for my review.      YOGA IS NOT A 4-LETTER WORD    by Anila Fleming    Obesity Action Coalition Fall 2012  https://[email or web address].org/resources/yoga-is-not-a-4-letter-word/    DISCLAIMER: To develop an exercise program that best suits your needs, please consult with your physician. It is important to talk with your doctor before beginning any exercise program.    Losing weight and  improving your health can often be a difficult journey. You may feel limited to the types of exercises you can perform. Yoga is an excellent low-impact exercise to help you strengthen your core and increase flexibility. The benefits of yoga can be yours regardless of your size!    What can yoga do for you?  Yoga is a discipline that’s thousands of years old. The main physical benefits of yoga are well documented:    Reduce Stress  Improve Balance  Increase Flexibility  Develop Core Strength    People come to the yoga mat wanting their physical bodies to change. However, it’s the feeling of well-being that brings people back to their yoga practice.    When asked, “What do you do?” I often see the look of disbelief creep across faces as I reply, “I’m a .” As a woman affected by obesity, I do not fit the image of a , marathoner or triathlete. Yet, that is who I am.    I see this same look of disbelief when I tell a person affected by obesity that they can do yoga right now in the body that they have today. Countless times I’ve been told that someone would do yoga, but only after they’ve lost weight. Unfortunately, this eliminates yoga as a tool for reclaiming their health based on their idea that yoga is only for the already thin and flexible. In fact, yoga can be done by everyone -- lying in bed, sitting in a wheelchair or standing only for brief moments, the benefits of yoga can still be yours.    Yoga as a Valuable Tool for Weight-loss  First time yoga students are often surprised at how much energy and effort it takes to come into and hold even the most simple of yoga poses. However, with yoga, it is not just calorie burning that best supports weight-loss.    Often, options to reclaim your health can be overwhelming. What to do is only the beginning of the process, as the answers to “who, where and when” can cloud and confuse the mind -- leading to no action at all. The easiest way to quiet  the mind for clearer thinking is do Deep Belly Breathing (see sidebar) and focus on the words “inhale” and “exhale” to the rhythm of your breath.    Mindfulness is another benefit of yoga that’s often overlooked. Setting your intentions or goals is an important footprint to your success. Some programs require close attention to guidelines to ensure health. Bringing awareness to your life choices, yoga encourages and reminds you to match your actions to your goals.    How to Start Your Yoga Practice  The best way to start a yoga practice is to sprinkle yoga into your daily life. Little pieces of yoga throughout the day will bring you huge benefits with ease - Deep Belly Breathing at a red light or during commercials while watching TV; standing in Mountain pose (see sidebar) while your coffee brews or the microwave is cooking; or gentle seated twists (see sidebar) in front of the computer while it starts or when you find yourself on hold. Taking any movement you have and holding it a little longer, reaching a little farther, will all be beneficial even if you’re confined to a bed.    When you decide it’s time to find a , location is still the first question to answer. You are more likely to be consistent if the class is convenient to your home or workplace. Call or contact the teacher by email to have a chance to talk about what you want and if they have a class that will fit your needs. Remember, you are the customer. Coming early to class will enable you to find a spot that supports your comfort zone. Some of us like to be in the back or close to a wall. Others want to be up front so we can see everything that’s going on.    If that first location isn’t for you, keep trying. Know that the right teacher and the right class are out there for you. Don’t suffer or spend your money on a class that isn’t working for you, but don’t give up either.    Once you’ve found a class, give yourself permission not to do  every pose that’s being taught. Listen for what the foundation movement is, as well as the benefit. From that information, move in a way that makes sense to your body. Go inward and remember -- there’s never any pain in yoga.    Yoga for Everyone!    3-Part Deep Belly Breath    This can be done anywhere and in any position -- seated, standing or even lying down. Breathing through your nose, soften your diaphragm. Inhale deeply expanding your lungs from bottom, middle to top. On the exhale, release from top, middle to bottom. Go slow. Enjoy the expansion of your lung capacity while you improve your cardiovascular exchange. Great for stress reduction -- I especially like to do this while waiting in the doctor’s office.    Simple Seated Twist    Sit so you’re not touching the back of your chair with your feet comfortably apart planted to the floor. On an exhale, reach across your body with your right hand to left knee or leg. The left hand goes back and can be supported on the seat or the back of the chair. Keep the energy of the hips grounded as you feel the twist deepen from hip to top of the head. Your eyes should look left in the direction of the twist. Breathe in shallow breaths that don’t interfere with the squeeze. Keep your belly relaxed. Hold this twist with the spine tall, even though it’s in rotation. Coming out, inhale back to center and twist to the other side. Twists are great to stimulate and cleanse all the organs and soft tissues of the torso.    Standing Mountain Pose    Stand with your feet at true hip-width apart. We often believe our hips are farther apart than they really are. Reach in from the front and find your hipbones. Place your feet directly below your new found hips -- it’s okay if your thighs squeeze! The important thing is to honor the alignment of your frame, not the flesh. Set your feet so the outside edges are parallel. This will make you feel a little pigeon-toed, causing your knees  to be soft. (Never stand with locked knees.) Engage both your abdominal and gluteal core. A gentle press of the shoulder blades will open your heart center and give you a feeling of lightness. You can stand like this anywhere and no one will even know you’re practicing yoga.    To add arm work, on an inhale, lift the arms out to the sides and up overhead framing the head at the ears, palms facing not touching. Hold and feel the energy from your feet to your fingertips. On an exhale, release the arms down and soften the whole body as you release the pose.    My 3 A’s  Yoga can bring to you what I call the Three A’s: Awareness, Acceptance and Affection. As you build your yoga practice, you’ll find yourself aware of your body in a new way. Your body’s edges will become clearer. Your everyday movements will deepen.    From awareness, you’ll begin to notice how different your body is day-to-day, and so begin to accept those differences -- especially the ones you can never change.    Finally, it is my deepest wish that you will come to love your body just as it is in the moment. Please remember, permanent changes come from love, not from hate -- and you deserve to be loved now and always.      Pilates: What Is It and What Are the Benefits?    by HENRIETTA Leblanc FNS    [employer]AC at www.obesityaction.org Winter 2023 Resource    Fitness trends come and go. Some resurface with a surge in popularity under the guise of reinvention. Fitness enthusiasts and hopeful newbies flock to the newest fitness format that promises everything your body needs. The interest peaks and then declines at the onset of the next trend in health and fitness. Thankfully, some formats stand the test of time while experiencing the ebbs and flows of popularity. Pilates is undoubtedly one of those fitness formats. It has been widely practiced by men and women for more than 100 years.    What is Pilates?  Created by and named after Cholo Vides in 1920,  Peewee was instrumental in the rehabilitation and corrective exercise movement. Cholo suffered illnesses in his youth that led him to try exercise and various forms of physical activity to help treat and manage his health conditions. As his health improved, he attributed it to his active lifestyle. He began sharing the benefits of movements he believed were essential to rehabbing ailments many people experienced. Cholo thought that for many of us, our lifestyles were the root cause of our illnesses, aches and pains. Cholo set out to explore, prove and correct the imbalances in his clients’ bodies that he believed resulted from limited movement, incorrect posture and overused/underused muscles. He said these were the underlying causes of various ailments and a hindrance to what he thought of as total mind-body wellness.    Pilates (initially Contrology) is often confused with being a form of yoga. While there may be similar elements, Pilates is more about control and stability. The essence of this low-impact exercise focuses on the core muscles: lower back, pelvis, abdomen and hips. The goal of Pilates is to improve posture, flexibility and strength to help prevent injuries and balance the muscles. The attention of Pilates centers on the correct alignment of the pelvis, shoulder girdle, spine, head and neck as much as possible to create stability and strength in the body. It is also often mistaken as a stretch-centered workout, which gives the impression of “easy.”    On the contrary, the continuous practice of challenging and stabilizing the core while incorporating efficient breathing patterns is present during the exercise sequences in Pilates. You can make exercise sequences more challenging or easier by using various apparatuses and props. The modifications are endless! This flexibility in level of difficulty makes Pilates workouts adaptable to just about every body type and a wide range of pathologies --  including, but not limited to:    Scoliosis  Pregnant clients  Shoulder injuries  Herniated discs  Osteoporosis    What Fitness Level is Pilates?  Pilates is a fitness format for everyone. The movements in Pilates are highly effective and efficient for beginners and people with joint issues. It is also ideal for those with physical limitations that often make other workout formats more difficult. At the same time, it is also excellent for building and maintaining control and strength for those who are already physically active. You can create a Pilates workout that will help you progress from a novice to a pro with consistency, various props and apparatuses, and dedication.    People with specific injuries and specialized populations often gravitate to Pilates under a referral from their doctors or, most times, in combination with other therapies. Using a wide variety of apparatuses allows Pilates to be highly adaptable and adjustable to a vast range of bodies at all fitness levels. In addition to the groups mentioned, Pilates has remained a mainstream fitness format that attracts athletes, dancers, performers and a wide range of movement enthusiasts.    What Are the Benefits of Pilates?  Pilates classes challenge every muscle group in the body, providing a total body workout with every session. The attention on engaging the core and aligning the spine while practicing efficient breathing patterns is instrumental in helping clients learn how to stabilize their bodies while performing the movements. This helps build core strength that improves so much of what we do daily and, ultimately, how we move overall.    Building your core muscles helps improve your posture, balance and movement.  Consistent practice will allow you to see the same benefits you would gain from strength training and toning exercises. Increased range of motion, better posture, a stronger back and greater flexibility are just a few of the widely  noted benefits of Pilates. Moreover, regular Pilates practice can provide increased endurance and greater focus.    What Equipment Do I Need for a Pilates Class?  Pilates practice is generally done using equipment in a studio, but it can be done without equipment as well. It is highly recommended that a beginner starts with mat practice. Mat Pilates relies more heavily on your body’s ability to stabilize and engage to perform each exercise in the repertoire. This is the foundational principle in Pilates practice. Fun fact: there are 34 exercises in the Mat repertoire! However, the widespread consensus is the element of fun and perceived authenticity of the exercise using specialized equipment -- namely, the reformer.    The reformer is one of the most famous pieces of Pilates equipment. It is best described as a bed-like frame with an adjustable foot bar, moving platform (carriage), straps attached to ropes to accommodate the feet and hands, and an assortment of springs to adjust the resistance based on the user’s fitness level, strength, and ability to stabilize during the movement. The reformer takes the workout to another level and provides unlimited options for training as you progress in your practice. It is undoubtedly my favorite piece of equipment.    Another popular but intimidating Pilates apparatus is the Inocencio or Trapeze Table. One look at a Inocencio, and it is easy to see why most people are a bit overwhelmed. Not only does it take up a significant amount of space, but the ropes, bars, springs and loops hanging off could confuse a novice. It resembles a large canopy bed with a cushioned mat. The Inocencio is excellent at adding the challenge of gravity to your practice. As with the mat and reformer, many exercises can be done and elevated to another level on the Inocencio. The amount of space and a higher experience level often limit this equipment to primary use in private studio sessions.    Other  popular items used in [email or web address] include:    The Chair: A cushioned backless “chair” with springs and a pedal that allows for varying resistance for various upper-body and lower-body exercises. Some chairs have handles for added challenges and safety.  The Barrels: Various-sized barrels (rounded/domed pieces) allow for modifications (regressions or progressions) and spine support in exercises. These include the baby barrel (smallest of the barrels), ladder barrel (largest of the barrels), and the Spine Corrector.  Pilates studios are widespread and abundant in many states. The practice’s popularity continues to grow, and the ease of access to physical and virtual content and classes is trending to bring Pilates to the mainstream. The resurgence over the last few years has hardcore enthusiasts investing in equipment for home use. Undoubtedly, the benefits are worth the cost of the pricey pieces. A reformer or a Inocencio can cost as much as $8,000-$10,000. Considering the price of Pilates classes (which cost a bit more than other fitness classes), purchasing equipment for home use makes sense for those dedicated to the practice. However, classes at clubs, gyms and studios with equipment are a great option to experience the benefits, receive guided instruction and build community.    Conclusion  With a long history of success and quantifiable benefits, it’s a great time to check out the Pilates practice and experience. As with any exercise, there are risks of injury. It is essential and recommended to get clearance from your doctor and to try classes with a certified professional to ensure proper movement and necessary assessments for your body and programming.    The popularity with many different populations is quite a testament to the benefits. Results are not immediately noticeable, but they will come over time. Improvements to your posture as your core strength improves, focus and stability, and the benefits of  better movement are all results of regular Pilates practice. If you prefer cardio-based workouts, I challenge you to add a Pilates class to your routine. As I always say, find a movement that works for you. Mix it up, but ultimately… just move!        Medication use and SEs reviewed with patient.    Return in about 3 months (around 7/28/2025) for as scheduled.    Patient verbalizes understanding.    DOCUMENTATION OF TIME SPENT: Code selection for this visit was based on time spent : 25 minutes on date of service in preparing to see the patient, obtaining and/or reviewing separately obtained history, performing a medically appropriate examination, counseling and educating the patient/family/caregiver, ordering medications or testing, referring and communicating with other healthcare providers, documenting clinical information in the electronic medical record, independently interpreting results and communicating results to the patient/family/caregiver and care coordination with the patient's other providers.      Answers submitted by the patient for this visit:  Medical Weight Loss Follow Up (Submitted on 4/25/2025)  If greater than 5/1O how would you grade your coping mechanisms?: moderate

## 2025-04-28 ENCOUNTER — OFFICE VISIT (OUTPATIENT)
Dept: INTERNAL MEDICINE CLINIC | Facility: CLINIC | Age: 65
End: 2025-04-28
Payer: COMMERCIAL

## 2025-04-28 VITALS
DIASTOLIC BLOOD PRESSURE: 78 MMHG | BODY MASS INDEX: 31.82 KG/M2 | HEIGHT: 65 IN | HEART RATE: 94 BPM | RESPIRATION RATE: 16 BRPM | SYSTOLIC BLOOD PRESSURE: 120 MMHG | WEIGHT: 191 LBS

## 2025-04-28 DIAGNOSIS — E66.811 CLASS 1 OBESITY WITH SERIOUS COMORBIDITY AND BODY MASS INDEX (BMI) OF 30.0 TO 30.9 IN ADULT, UNSPECIFIED OBESITY TYPE: ICD-10-CM

## 2025-04-28 DIAGNOSIS — R63.8 CRAVING FOR PARTICULAR FOOD: ICD-10-CM

## 2025-04-28 DIAGNOSIS — Z51.81 ENCOUNTER FOR THERAPEUTIC DRUG MONITORING: Primary | ICD-10-CM

## 2025-04-28 DIAGNOSIS — E55.9 VITAMIN D DEFICIENCY: ICD-10-CM

## 2025-04-28 DIAGNOSIS — Z86.39 HISTORY OF MORBID OBESITY: ICD-10-CM

## 2025-04-28 DIAGNOSIS — Z87.898 HISTORY OF PREDIABETES: ICD-10-CM

## 2025-04-28 PROCEDURE — 3074F SYST BP LT 130 MM HG: CPT | Performed by: NURSE PRACTITIONER

## 2025-04-28 PROCEDURE — 99213 OFFICE O/P EST LOW 20 MIN: CPT | Performed by: NURSE PRACTITIONER

## 2025-04-28 PROCEDURE — 3008F BODY MASS INDEX DOCD: CPT | Performed by: NURSE PRACTITIONER

## 2025-04-28 PROCEDURE — 3078F DIAST BP <80 MM HG: CPT | Performed by: NURSE PRACTITIONER

## 2025-04-28 RX ORDER — BUPROPION HYDROCHLORIDE 300 MG/1
300 TABLET ORAL EVERY MORNING
Qty: 90 TABLET | Refills: 1 | Status: SHIPPED | OUTPATIENT
Start: 2025-04-28

## 2025-04-28 RX ORDER — TIRZEPATIDE 15 MG/.5ML
15 INJECTION, SOLUTION SUBCUTANEOUS WEEKLY
Qty: 2 ML | Refills: 3 | Status: SHIPPED | OUTPATIENT
Start: 2025-04-28

## 2025-04-28 RX ORDER — TIRZEPATIDE 12.5 MG/.5ML
12.5 INJECTION, SOLUTION SUBCUTANEOUS WEEKLY
Qty: 2 ML | Refills: 0 | Status: SHIPPED | OUTPATIENT
Start: 2025-04-28

## 2025-04-28 RX ORDER — PHENTERMINE HYDROCHLORIDE 37.5 MG/1
37.5 TABLET ORAL EVERY MORNING
Qty: 90 TABLET | Refills: 0 | Status: SHIPPED | OUTPATIENT
Start: 2025-04-28

## 2025-04-28 RX ORDER — SEMAGLUTIDE 2.4 MG/.75ML
2.4 INJECTION, SOLUTION SUBCUTANEOUS WEEKLY
Qty: 9 ML | Refills: 1 | Status: CANCELLED | OUTPATIENT
Start: 2025-04-28

## 2025-04-28 RX ORDER — TOPIRAMATE 100 MG/1
100 TABLET, FILM COATED ORAL 2 TIMES DAILY
Qty: 180 TABLET | Refills: 1 | Status: SHIPPED | OUTPATIENT
Start: 2025-04-28

## 2025-04-28 RX ORDER — PREDNISONE 20 MG/1
TABLET ORAL
COMMUNITY
Start: 2025-04-16

## 2025-04-28 NOTE — PATIENT INSTRUCTIONS
Continue making lifestyle changes that focus on good nutrition, regular exercise and stress management.    Medication Plan: Continue current medication regimen aside from finish up Wegovy 2.4 mg pens and then 7 days later start Zepbound. If tolerating therapy well with successful weight loss can reduce Phentermine to 1/2 tab daily.    Start Zepbound at 12.5 mg weekly. After 4 weeks increase to the next dose of 15 mg weekly. Visit the website www.zepbound.Kimengi and click on Consumers for additional details, savings, and further dosing instructions. This medication may require a prior authorization (PA) by your insurance. A PA may take one week plus to complete and our office will be in touch during this process if needed.    Tips while taking an injectable medication:    Be an intuitive eater. Listen to your hunger and fullness signals, stopping when you are full.  Consume protein and produce in your day, striving for a rainbow of color of produce.  Reduce portions to starting size of 1 cup and check in with your gut to see if you are full. Use a sand timer to slow down your eating pace to allow for 15-20 minutes to complete a meal and use the \"2 bite rule\".  Reduce refined sugars and high fat foods, as they may contribute to greater side effects of nausea and heartburn.  Stop eating 3 hours before bedtime to allow your food to digest.  Remain hydrated with water or non caloric and non caffeine beverages.  Use over the counter subhash lozenge/supplement to help reduce nausea if needed.  If you have been off your medication for more than 2 weeks please notify our office to determine next dosing, as a return to previous dose may not be appropriate or tolerated.    Next steps to work on before next office visit include: Recommend at minimum weekly yoga or pilates to support core strength and weight loss. I have ordered fasting labs to be completed at any [facility] facility. If you have had labs in the past 1  year by your PCP please upload to [employer] for my review.      YOGA IS NOT A 4-LETTER WORD    by Anila Fleming    Obesity Action Coalition Fall 2012  https://www.obesityaction.org/resources/yoga-is-not-a-4-letter-word/    DISCLAIMER: To develop an exercise program that best suits your needs, please consult with your physician. It is important to talk with your doctor before beginning any exercise program.    Losing weight and improving your health can often be a difficult journey. You may feel limited to the types of exercises you can perform. Yoga is an excellent low-impact exercise to help you strengthen your core and increase flexibility. The benefits of yoga can be yours regardless of your size!    What can yoga do for you?  Yoga is a discipline that’s thousands of years old. The main physical benefits of yoga are well documented:    Reduce Stress  Improve Balance  Increase Flexibility  Develop Core Strength    People come to the yoga mat wanting their physical bodies to change. However, it’s the feeling of well-being that brings people back to their yoga practice.    When asked, “What do you do?” I often see the look of disbelief creep across faces as I reply, “I’m a .” As a woman affected by obesity, I do not fit the image of a , marathoner or triathlete. Yet, that is who I am.    I see this same look of disbelief when I tell a person affected by obesity that they can do yoga right now in the body that they have today. Countless times I’ve been told that someone would do yoga, but only after they’ve lost weight. Unfortunately, this eliminates yoga as a tool for reclaiming their health based on their idea that yoga is only for the already thin and flexible. In fact, yoga can be done by everyone -- lying in bed, sitting in a wheelchair or standing only for brief moments, the benefits of yoga can still be yours.    Yoga as a Valuable Tool for Weight-loss  First time yoga students are often  surprised at how much energy and effort it takes to come into and hold even the most simple of yoga poses. However, with yoga, it is not just calorie burning that best supports weight-loss.    Often, options to reclaim your health can be overwhelming. What to do is only the beginning of the process, as the answers to “who, where and when” can cloud and confuse the mind -- leading to no action at all. The easiest way to quiet the mind for clearer thinking is do Deep Belly Breathing (see sidebar) and focus on the words “inhale” and “exhale” to the rhythm of your breath.    Mindfulness is another benefit of yoga that’s often overlooked. Setting your intentions or goals is an important footprint to your success. Some programs require close attention to guidelines to ensure health. Bringing awareness to your life choices, yoga encourages and reminds you to match your actions to your goals.    How to Start Your Yoga Practice  The best way to start a yoga practice is to sprinkle yoga into your daily life. Little pieces of yoga throughout the day will bring you huge benefits with ease - Deep Belly Breathing at a red light or during commercials while watching TV; standing in Mountain pose (see sidebar) while your coffee brews or the microwave is cooking; or gentle seated twists (see sidebar) in front of the computer while it starts or when you find yourself on hold. Taking any movement you have and holding it a little longer, reaching a little farther, will all be beneficial even if you’re confined to a bed.    When you decide it’s time to find a , location is still the first question to answer. You are more likely to be consistent if the class is convenient to your home or workplace. Call or contact the teacher by email to have a chance to talk about what you want and if they have a class that will fit your needs. Remember, you are the customer. Coming early to class will enable you to find a spot that supports  your comfort zone. Some of us like to be in the back or close to a wall. Others want to be up front so we can see everything that’s going on.    If that first location isn’t for you, keep trying. Know that the right teacher and the right class are out there for you. Don’t suffer or spend your money on a class that isn’t working for you, but don’t give up either.    Once you’ve found a class, give yourself permission not to do every pose that’s being taught. Listen for what the foundation movement is, as well as the benefit. From that information, move in a way that makes sense to your body. Go inward and remember -- there’s never any pain in yoga.    Yoga for Everyone!    3-Part Deep Belly Breath    This can be done anywhere and in any position -- seated, standing or even lying down. Breathing through your nose, soften your diaphragm. Inhale deeply expanding your lungs from bottom, middle to top. On the exhale, release from top, middle to bottom. Go slow. Enjoy the expansion of your lung capacity while you improve your cardiovascular exchange. Great for stress reduction -- I especially like to do this while waiting in the doctor’s office.    Simple Seated Twist    Sit so you’re not touching the back of your chair with your feet comfortably apart planted to the floor. On an exhale, reach across your body with your right hand to left knee or leg. The left hand goes back and can be supported on the seat or the back of the chair. Keep the energy of the hips grounded as you feel the twist deepen from hip to top of the head. Your eyes should look left in the direction of the twist. Breathe in shallow breaths that don’t interfere with the squeeze. Keep your belly relaxed. Hold this twist with the spine tall, even though it’s in rotation. Coming out, inhale back to center and twist to the other side. Twists are great to stimulate and cleanse all the organs and soft tissues of the torso.    Standing Mountain Pose    Stand  with your feet at true hip-width apart. We often believe our hips are farther apart than they really are. Reach in from the front and find your hipbones. Place your feet directly below your new found hips -- it’s okay if your thighs squeeze! The important thing is to honor the alignment of your frame, not the flesh. Set your feet so the outside edges are parallel. This will make you feel a little pigeon-toed, causing your knees to be soft. (Never stand with locked knees.) Engage both your abdominal and gluteal core. A gentle press of the shoulder blades will open your heart center and give you a feeling of lightness. You can stand like this anywhere and no one will even know you’re practicing yoga.    To add arm work, on an inhale, lift the arms out to the sides and up overhead framing the head at the ears, palms facing not touching. Hold and feel the energy from your feet to your fingertips. On an exhale, release the arms down and soften the whole body as you release the pose.    My 3 A’s  Yoga can bring to you what I call the Three A’s: Awareness, Acceptance and Affection. As you build your yoga practice, you’ll find yourself aware of your body in a new way. Your body’s edges will become clearer. Your everyday movements will deepen.    From awareness, you’ll begin to notice how different your body is day-to-day, and so begin to accept those differences -- especially the ones you can never change.    Finally, it is my deepest wish that you will come to love your body just as it is in the moment. Please remember, permanent changes come from love, not from hate -- and you deserve to be loved now and always.      Pilates: What Is It and What Are the Benefits?    by HENRIETTA Leblanc FNS    OAC at www.obesityaction.org Winter 2023 Resource    Fitness trends come and go. Some resurface with a surge in popularity under the guise of reinvention. Fitness enthusiasts and hopeful newbies flock to the newest fitness  format that promises everything your body needs. The interest peaks and then declines at the onset of the next trend in health and fitness. Thankfully, some formats stand the test of time while experiencing the ebbs and flows of popularity. Pilates is undoubtedly one of those fitness formats. It has been widely practiced by men and women for more than 100 years.    What is Pilates?  Created by and named after Cholo Vides in 1920, Pee[place]e was instrumental in the rehabilitation and corrective exercise movement. Cholo suffered illnesses in his youth that led him to try exercise and various forms of physical activity to help treat and manage his health conditions. As his health improved, he attributed it to his active lifestyle. He began sharing the benefits of movements he believed were essential to rehabbing ailments many people experienced. Cholo thought that for many of us, our lifestyles were the root cause of our illnesses, aches and pains. Cholo set out to explore, prove and correct the imbalances in his clients’ bodies that he believed resulted from limited movement, incorrect posture and overused/underused muscles. He said these were the underlying causes of various ailments and a hindrance to what he thought of as total mind-body wellness.    Pilates (initially Contrology) is often confused with being a form of yoga. While there may be similar elements, Pilates is more about control and stability. The essence of this low-impact exercise focuses on the core muscles: lower back, pelvis, abdomen and hips. The goal of Pilates is to improve posture, flexibility and strength to help prevent injuries and balance the muscles. The attention of Pilates centers on the correct alignment of the pelvis, shoulder girdle, spine, head and neck as much as possible to create stability and strength in the body. It is also often mistaken as a stretch-centered workout, which gives the impression of “easy.”    On the contrary,  the continuous practice of challenging and stabilizing the core while incorporating efficient breathing patterns is present during the exercise sequences in Pilates. You can make exercise sequences more challenging or easier by using various apparatuses and props. The modifications are endless! This flexibility in level of difficulty makes Pilates workouts adaptable to just about every body type and a wide range of pathologies -- including, but not limited to:    Scoliosis  Pregnant clients  Shoulder injuries  Herniated discs  Osteoporosis    What Fitness Level is Pilates?  Pilates is a fitness format for everyone. The movements in Pilates are highly effective and efficient for beginners and people with joint issues. It is also ideal for those with physical limitations that often make other workout formats more difficult. At the same time, it is also excellent for building and maintaining control and strength for those who are already physically active. You can create a Pilates workout that will help you progress from a novice to a pro with consistency, various props and apparatuses, and dedication.    People with specific injuries and specialized populations often gravitate to Pilates under a referral from their doctors or, most times, in combination with other therapies. Using a wide variety of apparatuses allows Pilates to be highly adaptable and adjustable to a vast range of bodies at all fitness levels. In addition to the groups mentioned, Pilates has remained a mainstream fitness format that attracts athletes, dancers, performers and a wide range of movement enthusiasts.    What Are the Benefits of Pilates?  Pilates classes challenge every muscle group in the body, providing a total body workout with every session. The attention on engaging the core and aligning the spine while practicing efficient breathing patterns is instrumental in helping clients learn how to stabilize their bodies while performing the  movements. This helps build core strength that improves so much of what we do daily and, ultimately, how we move overall.    Building your core muscles helps improve your posture, balance and movement.  Consistent practice will allow you to see the same benefits you would gain from strength training and toning exercises. Increased range of motion, better posture, a stronger back and greater flexibility are just a few of the widely noted benefits of Pilates. Moreover, regular Pilates practice can provide increased endurance and greater focus.    What Equipment Do I Need for a Pilates Class?  Pilates practice is generally done using equipment in a studio, but it can be done without equipment as well. It is highly recommended that a beginner starts with mat practice. Mat Pilates relies more heavily on your body’s ability to stabilize and engage to perform each exercise in the repertoire. This is the foundational principle in Pilates practice. Fun fact: there are 34 exercises in the Mat repertoire! However, the widespread consensus is the element of fun and perceived authenticity of the exercise using specialized equipment -- namely, the reformer.    The reformer is one of the most famous pieces of Pilates equipment. It is best described as a bed-like frame with an adjustable foot bar, moving platform (carriage), straps attached to ropes to accommodate the feet and hands, and an assortment of springs to adjust the resistance based on the user’s fitness level, strength, and ability to stabilize during the movement. The reformer takes the workout to another level and provides unlimited options for training as you progress in your practice. It is undoubtedly my favorite piece of equipment.    Another popular but intimidating Pilates apparatus is the [place] or Trapeze Table. One look at a [place], and it is easy to see why most people are a bit overwhelmed. Not only does it take up a significant amount of space, but the  ropes, bars, springs and loops hanging off could confuse a novice. It resembles a large canopy bed with a cushioned mat. The Inocencio is excellent at adding the challenge of gravity to your practice. As with the mat and reformer, many exercises can be done and elevated to another level on the Inocencio. The amount of space and a higher experience level often limit this equipment to primary use in private studio sessions.    Other popular items used in Pilates include:    The Chair: A cushioned backless “chair” with springs and a pedal that allows for varying resistance for various upper-body and lower-body exercises. Some chairs have handles for added challenges and safety.  The Barrels: Various-sized barrels (rounded/domed pieces) allow for modifications (regressions or progressions) and spine support in exercises. These include the baby barrel (smallest of the barrels), ladder barrel (largest of the barrels), and the Spine Corrector.  Pilates studios are widespread and abundant in many states. The practice’s popularity continues to grow, and the ease of access to physical and virtual content and classes is trending to bring Pilates to the mainstream. The resurgence over the last few years has hardcore enthusiasts investing in equipment for home use. Undoubtedly, the benefits are worth the cost of the pricey pieces. A reformer or a [place] can cost as much as $8,000-$10,000. Considering the price of Pilates classes (which cost a bit more than other fitness classes), purchasing equipment for home use makes sense for those dedicated to the practice. However, classes at clubs, gyms and studios with equipment are a great option to experience the benefits, receive guided instruction and build community.    Conclusion  With a long history of success and quantifiable benefits, it’s a great time to check out the Pilates practice and experience. As with any exercise, there are risks of injury. It is essential and  recommended to get clearance from your doctor and to try classes with a certified professional to ensure proper movement and necessary assessments for your body and programming.    The popularity with many different populations is quite a testament to the benefits. Results are not immediately noticeable, but they will come over time. Improvements to your posture as your core strength improves, focus and stability, and the benefits of better movement are all results of regular Pilates practice. If you prefer cardio-based workouts, I challenge you to add a Pilates class to your routine. As I always say, find a movement that works for you. Mix it up, but ultimately… just move!

## 2025-05-17 ENCOUNTER — LAB ENCOUNTER (OUTPATIENT)
Dept: LAB | Age: 65
End: 2025-05-17
Attending: NURSE PRACTITIONER
Payer: COMMERCIAL

## 2025-05-17 DIAGNOSIS — E55.9 VITAMIN D DEFICIENCY: ICD-10-CM

## 2025-05-17 DIAGNOSIS — Z51.81 ENCOUNTER FOR THERAPEUTIC DRUG MONITORING: ICD-10-CM

## 2025-05-17 DIAGNOSIS — E66.811 CLASS 1 OBESITY WITH SERIOUS COMORBIDITY AND BODY MASS INDEX (BMI) OF 30.0 TO 30.9 IN ADULT, UNSPECIFIED OBESITY TYPE: ICD-10-CM

## 2025-05-17 DIAGNOSIS — Z87.898 HISTORY OF PREDIABETES: ICD-10-CM

## 2025-05-17 DIAGNOSIS — Z86.39 HISTORY OF MORBID OBESITY: ICD-10-CM

## 2025-05-17 LAB
ALBUMIN SERPL-MCNC: 4.8 G/DL (ref 3.2–4.8)
ALBUMIN/GLOB SERPL: 2.1 {RATIO} (ref 1–2)
ALP LIVER SERPL-CCNC: 85 U/L (ref 50–130)
ALT SERPL-CCNC: 11 U/L (ref 10–49)
ANION GAP SERPL CALC-SCNC: 10 MMOL/L (ref 0–18)
AST SERPL-CCNC: 19 U/L (ref ?–34)
BASOPHILS # BLD AUTO: 0.06 X10(3) UL (ref 0–0.2)
BASOPHILS NFR BLD AUTO: 1.1 %
BILIRUB SERPL-MCNC: 0.7 MG/DL (ref 0.2–1.1)
BUN BLD-MCNC: 18 MG/DL (ref 9–23)
CALCIUM BLD-MCNC: 9.6 MG/DL (ref 8.7–10.6)
CHLORIDE SERPL-SCNC: 107 MMOL/L (ref 98–112)
CHOLEST SERPL-MCNC: 256 MG/DL (ref ?–200)
CO2 SERPL-SCNC: 24 MMOL/L (ref 21–32)
CREAT BLD-MCNC: 1.15 MG/DL (ref 0.55–1.02)
EGFRCR SERPLBLD CKD-EPI 2021: 54 ML/MIN/1.73M2 (ref 60–?)
EOSINOPHIL # BLD AUTO: 0.12 X10(3) UL (ref 0–0.7)
EOSINOPHIL NFR BLD AUTO: 2.1 %
ERYTHROCYTE [DISTWIDTH] IN BLOOD BY AUTOMATED COUNT: 13.8 %
EST. AVERAGE GLUCOSE BLD GHB EST-MCNC: 111 MG/DL (ref 68–126)
FASTING PATIENT LIPID ANSWER: YES
FASTING STATUS PATIENT QL REPORTED: YES
GLOBULIN PLAS-MCNC: 2.3 G/DL (ref 2–3.5)
GLUCOSE BLD-MCNC: 78 MG/DL (ref 70–99)
HBA1C MFR BLD: 5.5 % (ref ?–5.7)
HCT VFR BLD AUTO: 45.1 % (ref 35–48)
HDLC SERPL-MCNC: 100 MG/DL (ref 40–59)
HGB BLD-MCNC: 14.6 G/DL (ref 12–16)
IMM GRANULOCYTES # BLD AUTO: 0 X10(3) UL (ref 0–1)
IMM GRANULOCYTES NFR BLD: 0 %
LDLC SERPL CALC-MCNC: 145 MG/DL (ref ?–100)
LYMPHOCYTES # BLD AUTO: 3.04 X10(3) UL (ref 1–4)
LYMPHOCYTES NFR BLD AUTO: 53.2 %
MCH RBC QN AUTO: 29 PG (ref 26–34)
MCHC RBC AUTO-ENTMCNC: 32.4 G/DL (ref 31–37)
MCV RBC AUTO: 89.7 FL (ref 80–100)
MONOCYTES # BLD AUTO: 0.28 X10(3) UL (ref 0.1–1)
MONOCYTES NFR BLD AUTO: 4.9 %
NEUTROPHILS # BLD AUTO: 2.21 X10 (3) UL (ref 1.5–7.7)
NEUTROPHILS # BLD AUTO: 2.21 X10(3) UL (ref 1.5–7.7)
NEUTROPHILS NFR BLD AUTO: 38.7 %
NONHDLC SERPL-MCNC: 156 MG/DL (ref ?–130)
OSMOLALITY SERPL CALC.SUM OF ELEC: 293 MOSM/KG (ref 275–295)
PLATELET # BLD AUTO: 367 10(3)UL (ref 150–450)
POTASSIUM SERPL-SCNC: 3.5 MMOL/L (ref 3.5–5.1)
PROT SERPL-MCNC: 7.1 G/DL (ref 5.7–8.2)
RBC # BLD AUTO: 5.03 X10(6)UL (ref 3.8–5.3)
SODIUM SERPL-SCNC: 141 MMOL/L (ref 136–145)
T4 FREE SERPL-MCNC: 1.3 NG/DL (ref 0.8–1.7)
TRIGL SERPL-MCNC: 67 MG/DL (ref 30–149)
TSI SER-ACNC: 1.32 UIU/ML (ref 0.55–4.78)
VIT B12 SERPL-MCNC: 659 PG/ML (ref 211–911)
VIT D+METAB SERPL-MCNC: 19.4 NG/ML (ref 30–100)
VLDLC SERPL CALC-MCNC: 12 MG/DL (ref 0–30)
WBC # BLD AUTO: 5.7 X10(3) UL (ref 4–11)

## 2025-05-17 PROCEDURE — 80061 LIPID PANEL: CPT | Performed by: NURSE PRACTITIONER

## 2025-05-17 PROCEDURE — 85025 COMPLETE CBC W/AUTO DIFF WBC: CPT | Performed by: NURSE PRACTITIONER

## 2025-05-17 PROCEDURE — 84443 ASSAY THYROID STIM HORMONE: CPT

## 2025-05-17 PROCEDURE — 82607 VITAMIN B-12: CPT | Performed by: NURSE PRACTITIONER

## 2025-05-17 PROCEDURE — 80053 COMPREHEN METABOLIC PANEL: CPT | Performed by: NURSE PRACTITIONER

## 2025-05-17 PROCEDURE — 83036 HEMOGLOBIN GLYCOSYLATED A1C: CPT

## 2025-05-17 PROCEDURE — 84439 ASSAY OF FREE THYROXINE: CPT

## 2025-05-17 PROCEDURE — 82306 VITAMIN D 25 HYDROXY: CPT

## 2025-05-17 PROCEDURE — 36415 COLL VENOUS BLD VENIPUNCTURE: CPT | Performed by: NURSE PRACTITIONER

## 2025-05-19 ENCOUNTER — PATIENT MESSAGE (OUTPATIENT)
Dept: INTERNAL MEDICINE CLINIC | Facility: CLINIC | Age: 65
End: 2025-05-19

## 2025-07-16 DIAGNOSIS — Z86.39 HISTORY OF MORBID OBESITY: ICD-10-CM

## 2025-07-16 DIAGNOSIS — E66.811 CLASS 1 OBESITY WITH SERIOUS COMORBIDITY AND BODY MASS INDEX (BMI) OF 30.0 TO 30.9 IN ADULT, UNSPECIFIED OBESITY TYPE: ICD-10-CM

## 2025-07-16 DIAGNOSIS — R63.8 CRAVING FOR PARTICULAR FOOD: ICD-10-CM

## 2025-07-16 DIAGNOSIS — Z51.81 ENCOUNTER FOR THERAPEUTIC DRUG MONITORING: ICD-10-CM

## 2025-07-16 NOTE — TELEPHONE ENCOUNTER
[facility] sent fax to get 3 month order for zepbound 15 mg    Requesting zepbound 15 mg  LOV: 4/28/25  RTC: 3 months  Last Relevant Labs: na  Filled: 5/22/25 #6 ml with 0 refills 15 mg dose    Future Appointments   Date Time Provider Department Center   10/8/2025  4:20 PM Dayana Rock APRN [facility]     4/28/25 191#    7/28/25 visit cancelled by patient on wait list

## 2025-07-18 RX ORDER — TIRZEPATIDE 15 MG/.5ML
15 INJECTION, SOLUTION SUBCUTANEOUS WEEKLY
Qty: 6 ML | Refills: 0 | Status: SHIPPED | OUTPATIENT
Start: 2025-07-18

## (undated) DIAGNOSIS — E66.01 MORBID OBESITY WITH BMI OF 40.0-44.9, ADULT (HCC): ICD-10-CM

## (undated) DIAGNOSIS — Z51.81 ENCOUNTER FOR THERAPEUTIC DRUG MONITORING: ICD-10-CM

## (undated) NOTE — LETTER
[address], [address] [facility], [facility], [address] [facility]  [phone]            February 22, 2021      Siobhan Rivers [id number]      Dear Ms

## (undated) NOTE — LETTER
[facility], [address] [facility], [id number] [place] [address]  [phone]            October 6, 2021      Milli Richey  [phone]      Dear Ms.

## (undated) NOTE — Clinical Note
I just wanted to send an update on our mutual patient, Sarah Pinto. She was seen for 1 month f/u at [facility] today with a total weight loss of 6# since initial consult.  Her labs showed prediabetes (HgbA1c 6.1) and WANG with nallely